# Patient Record
Sex: FEMALE | Race: WHITE | Employment: UNEMPLOYED | URBAN - METROPOLITAN AREA
[De-identification: names, ages, dates, MRNs, and addresses within clinical notes are randomized per-mention and may not be internally consistent; named-entity substitution may affect disease eponyms.]

---

## 2018-08-23 ENCOUNTER — HOSPITAL ENCOUNTER (OUTPATIENT)
Dept: WOUND CARE | Age: 48
Discharge: HOME | End: 2018-08-23
Attending: PHYSICIAN ASSISTANT | Admitting: PHYSICIAN ASSISTANT

## 2018-08-23 VITALS — RESPIRATION RATE: 16 BRPM | SYSTOLIC BLOOD PRESSURE: 173 MMHG | HEART RATE: 76 BPM | DIASTOLIC BLOOD PRESSURE: 101 MMHG

## 2018-08-23 RX ORDER — ATENOLOL 50 MG/1
50 TABLET ORAL DAILY
COMMUNITY

## 2018-08-23 RX ORDER — TRAMADOL HYDROCHLORIDE 50 MG/1
50 TABLET ORAL EVERY 4 HOURS PRN
COMMUNITY

## 2018-08-23 RX ORDER — LIDOCAINE HYDROCHLORIDE 40 MG/ML
SOLUTION TOPICAL ONCE
Status: DISCONTINUED | OUTPATIENT
Start: 2018-08-23 | End: 2018-08-24 | Stop reason: HOSPADM

## 2018-08-23 ASSESSMENT — PAIN DESCRIPTION - PAIN TYPE: TYPE: CHRONIC PAIN

## 2018-08-30 ENCOUNTER — HOSPITAL ENCOUNTER (OUTPATIENT)
Dept: WOUND CARE | Age: 48
Discharge: HOME | End: 2018-08-30
Attending: PHYSICIAN ASSISTANT | Admitting: PHYSICIAN ASSISTANT

## 2018-08-30 VITALS
HEART RATE: 81 BPM | RESPIRATION RATE: 16 BRPM | TEMPERATURE: 98 F | SYSTOLIC BLOOD PRESSURE: 159 MMHG | DIASTOLIC BLOOD PRESSURE: 91 MMHG

## 2018-08-30 RX ORDER — LOSARTAN POTASSIUM 25 MG/1
25 TABLET ORAL DAILY
COMMUNITY

## 2018-08-30 RX ORDER — LIDOCAINE HYDROCHLORIDE 40 MG/ML
SOLUTION TOPICAL ONCE
Status: DISCONTINUED | OUTPATIENT
Start: 2018-08-30 | End: 2018-08-31 | Stop reason: HOSPADM

## 2018-08-30 ASSESSMENT — PAIN DESCRIPTION - PAIN TYPE: TYPE: CHRONIC PAIN

## 2018-08-30 ASSESSMENT — PAIN SCALES - GENERAL: PAINLEVEL_OUTOF10: 9

## 2018-08-30 ASSESSMENT — PAIN DESCRIPTION - LOCATION: LOCATION: GENERALIZED

## 2018-08-31 NOTE — PROGRESS NOTES
Image   8/23/2018  1:45 PM   Wound Type Wound 8/30/2018  1:39 PM   Wound Pressure Stage  3 8/30/2018  1:39 PM   Wound Cleansed Rinsed/Irrigated with saline 8/30/2018  1:39 PM   Wound Length (cm) 1.4 cm 8/30/2018  1:56 PM   Wound Width (cm) 1.2 cm 8/30/2018  1:56 PM   Wound Depth (cm)  0.1 8/30/2018  1:56 PM   Calculated Wound Size (cm^2) (l*w) 1.68 cm^2 8/30/2018  1:56 PM   Change in Wound Size % (l*w) 6.67 8/30/2018  1:56 PM   Wound Assessment Bleeding 8/30/2018  1:56 PM   Drainage Amount Moderate 8/30/2018  1:56 PM   Drainage Description Serosanguinous 8/30/2018  1:56 PM   Odor None 8/30/2018  1:39 PM   Sarah-wound Assessment Dry 8/30/2018  1:39 PM   [place]%Wound Bed 80 8/30/2018  1:39 PM   Red%Wound Bed 0 8/30/2018  1:39 PM   Yellow%Wound Bed 20 8/30/2018  1:39 PM   Black%Wound Bed 0 8/30/2018  1:39 PM   Purple%Wound Bed 0 8/30/2018  1:39 PM   Other%Wound Bed 0 8/30/2018  1:39 PM   Time out Yes 8/30/2018  1:39 PM   Op First Treatment Date 08/23/18 8/23/2018  1:45 PM   Number of days: 7           Total Surface Area Debrided:  1.68 sq cm     Percentage of wound debrided 100%    Bleeding:  Minimal    Hemostasis Achieved:  by pressure    Procedural Pain:  0  / 10     Post Procedural Pain:  0 / 10     Response to treatment:  Well tolerated by patient. The nature of the patient's condition was explained in depth.  The patient was informed that their compliance to the treatment plan is paramount to successful healing and prevention of further ulceration and/or infection     Written Patient Discharge Instructions Given            Electronically signed by Porter Muñoz PA-C on 8/30/2018 at 8:14 PM

## 2018-09-06 ENCOUNTER — HOSPITAL ENCOUNTER (OUTPATIENT)
Dept: WOUND CARE | Age: 48
Discharge: HOME | End: 2018-09-06
Attending: PHYSICIAN ASSISTANT | Admitting: PHYSICIAN ASSISTANT

## 2018-09-06 VITALS — SYSTOLIC BLOOD PRESSURE: 176 MMHG | HEART RATE: 80 BPM | DIASTOLIC BLOOD PRESSURE: 104 MMHG

## 2018-09-06 DIAGNOSIS — L97.321 LOWER LIMB ULCER, ANKLE, LEFT, LIMITED TO BREAKDOWN OF SKIN (HCC): Primary | ICD-10-CM

## 2018-09-06 RX ORDER — LIDOCAINE HYDROCHLORIDE 40 MG/ML
SOLUTION TOPICAL ONCE
Status: DISCONTINUED | OUTPATIENT
Start: 2018-09-06 | End: 2018-09-07 | Stop reason: HOSPADM

## 2018-09-06 ASSESSMENT — PAIN DESCRIPTION - PAIN TYPE: TYPE: CHRONIC PAIN

## 2018-09-13 ENCOUNTER — HOSPITAL ENCOUNTER (OUTPATIENT)
Dept: WOUND CARE | Age: 48
Discharge: HOME | End: 2018-09-13
Attending: PHYSICIAN ASSISTANT | Admitting: PHYSICIAN ASSISTANT

## 2018-09-13 VITALS
TEMPERATURE: 97.4 F | RESPIRATION RATE: 16 BRPM | SYSTOLIC BLOOD PRESSURE: 157 MMHG | HEART RATE: 68 BPM | DIASTOLIC BLOOD PRESSURE: 92 MMHG

## 2018-09-13 RX ORDER — LIDOCAINE HYDROCHLORIDE 40 MG/ML
SOLUTION TOPICAL ONCE
Status: DISCONTINUED | OUTPATIENT
Start: 2018-09-13 | End: 2018-09-14 | Stop reason: HOSPADM

## 2018-09-20 ENCOUNTER — HOSPITAL ENCOUNTER (OUTPATIENT)
Dept: WOUND CARE | Age: 48
Discharge: HOME | End: 2018-09-20
Attending: PHYSICIAN ASSISTANT | Admitting: PHYSICIAN ASSISTANT

## 2018-09-20 ENCOUNTER — HOSPITAL ENCOUNTER (OUTPATIENT)
Dept: OTHER | Age: 48
Discharge: HOME | End: 2018-09-20
Attending: PHYSICIAN ASSISTANT | Admitting: PHYSICIAN ASSISTANT

## 2018-09-20 VITALS — DIASTOLIC BLOOD PRESSURE: 104 MMHG | RESPIRATION RATE: 16 BRPM | HEART RATE: 95 BPM | SYSTOLIC BLOOD PRESSURE: 159 MMHG

## 2018-09-20 DIAGNOSIS — L97.321 LOWER LIMB ULCER, ANKLE, LEFT, LIMITED TO BREAKDOWN OF SKIN (HCC): ICD-10-CM

## 2018-09-20 LAB
C-REACTIVE PROTEIN: 4.4 MG/L (ref 0–5.1)
SEDIMENTATION RATE, ERYTHROCYTE: 9 MM/HR (ref 0–20)

## 2018-09-20 RX ORDER — LIDOCAINE HYDROCHLORIDE 40 MG/ML
SOLUTION TOPICAL ONCE
Status: DISCONTINUED | OUTPATIENT
Start: 2018-09-20 | End: 2018-09-21 | Stop reason: HOSPADM

## 2018-09-20 NOTE — PROGRESS NOTES
[facility]  Progress Note and Procedure Note      Diana Senior  AGE: 53 y.o. GENDER: female  : 1970  TODAY'S DATE:  2018    Subjective:     Chief Complaint   Patient presents with    Wound Check     Left Ankle wound         HISTORY of PRESENT ILLNESS BRAYAN Fajardo a 53 y.o. female who presents today for wound evaluation. History of Wound: Patient is a pleasant 54 yo paraplegic female who presented with a left lateral ankle ulcer that started back in . She believes it is related to her hitting her ankle on her shower chair-she also already cushioned this area. She has been applying a bandaid on it. Just got workman comp to approve wound visit. She was paralyzed at L1 back in  after falling off a piece of construction equipment. She has chronic pain of back and thighs that are intermittent. She has no sensation in her lower legs.       Wound Pain:  none  Severity:  0 / 10   Wound Type:  traumatic  Modifying Factors:  chronic pressure and decreased mobility  Associated Signs/Symptoms:  drainage          PAST MEDICAL HISTORY        Diagnosis Date    Hypertension     Neuromuscular disorder ([facility] [address]     L1 paralysis, accident       PAST SURGICAL HISTORY    Past Surgical History:   Procedure Laterality Date    SPINAL FUSION         FAMILY HISTORY    History reviewed. No pertinent family history.     SOCIAL HISTORY    Social History   Substance Use Topics    Smoking status: Never Smoker    Smokeless tobacco: Never Used    Alcohol use Yes      Comment: socially       ALLERGIES    Allergies   Allergen Reactions    Sulfa Antibiotics Shortness Of Breath       MEDICATIONS    Current Outpatient Prescriptions on File Prior to Encounter   Medication Sig Dispense Refill    losartan (COZAAR) 25 MG tablet Take 25 mg by mouth daily      atenolol (TENORMIN) 50 MG tablet Take 50 mg by mouth daily      traMADol (ULTRAM) 50 MG tablet Take 50 mg by mouth every 4 hours as needed for Pain. .       No current facility-administered medications on file prior to encounter. REVIEW OF SYSTEMS    Pertinent items are noted in HPI. Objective:      BP (!) 159/104   Pulse 95   Resp 16     PHYSICAL EXAM       General Appearance: alert and oriented to person, place and time, well developed and well- nourished, in no acute distress  Skin: ulcer left lateral ankle. No cellulitis. Head: normocephalic and atraumatic  Neck: supple and non-tender without mass, no thyromegaly or thyroid nodules, no cervical lymphadenopathy  Pulmonary/Chest: clear to auscultation bilaterally- no wheezes, rales or rhonchi, normal air movement, no respiratory distress  Cardiovascular: normal rate, regular rhythm, normal S1 and S2, no murmurs, rubs, clicks, or gallops, distal pulses intact, no carotid bruits  Abdomen: soft, non-tender, non-distended, normal bowel sounds, no masses or organomegaly  Extremities: 1+ edema L leg  Musculoskeletal: atrophied and paralyzed BLE. Neurologic: reflexes normal and symmetric, no cranial nerve deficit,paralyzes form waist down      L ankle xray: no evidence of osteo  crp 4.4-normal  Sed 9-normal  Assessment and plan:     1. Pressure Ulcer left later ankle over malleolus-subcutaneous. Ulcer is superficial. No cellulitis. Wound debrided. Cutimed and tubi .     2. Paraplegia-encourage her to have padding/offloading of ankle. Procedure Note    Performed by: Kee Quintero PA-C    Consent obtained: Yes    Time out taken:  Yes    Pain Control: Anesthetic  Anesthetic: 4% Lidocaine Liquid Topical     Debridement:Excisional Debridement    Using curette the wound was sharply debrided    down through and including the removal of subcutaneous tissue.         Devitalized Tissue Debrided:  fibrin, slough and necrotic/eschar    Pre Debridement Measurements:  Are located in the Wound Documentation Flow Sheet    Wound #: 1     Post  Debridement Measurements:  Wound

## 2018-09-27 ENCOUNTER — HOSPITAL ENCOUNTER (OUTPATIENT)
Dept: WOUND CARE | Age: 48
Discharge: HOME | End: 2018-09-27
Payer: COMMERCIAL

## 2018-09-27 DIAGNOSIS — L89.893 PRESSURE ULCER OF LEFT FOOT, STAGE 3 (HCC): ICD-10-CM

## 2018-09-27 PROCEDURE — 11042 DBRDMT SUBQ TIS 1ST 20SQCM/<: CPT

## 2018-09-27 PROCEDURE — 11042 DBRDMT SUBQ TIS 1ST 20SQCM/<: CPT | Performed by: INTERNAL MEDICINE

## 2018-09-27 RX ORDER — LIDOCAINE HYDROCHLORIDE 40 MG/ML
SOLUTION TOPICAL ONCE
Status: DISCONTINUED | OUTPATIENT
Start: 2018-09-27 | End: 2018-09-28 | Stop reason: HOSPADM

## 2018-09-27 NOTE — PROGRESS NOTES
[facility]  Progress Note and Procedure Note      Reed Smith  AGE: 53 y.o. GENDER: female  : 1970  TODAY'S DATE:  2018    Subjective:     Chief Complaint   Patient presents with    Wound Check     Left Ankle         HISTORY of PRESENT ILLNESS HPI     Reed Smith is a 53 y.o. female who presents today for wound evaluation. History of Wound: 52 yo paraplegic female who presented with a left lateral ankle ulcer that started back in   Wound Pain:  none  Severity:  0 / 10   Wound Type:  traumatic  Modifying Factors:  chronic pressure and decreased mobility  Associated Signs/Symptoms:  drainage            PAST MEDICAL HISTORY        Diagnosis Date    Hypertension     Neuromuscular disorder ([facility] [address]     L1 paralysis, accident       PAST SURGICAL HISTORY    Past Surgical History:   Procedure Laterality Date    SPINAL FUSION         FAMILY HISTORY    History reviewed. No pertinent family history. SOCIAL HISTORY    Social History   Substance Use Topics    Smoking status: Never Smoker    Smokeless tobacco: Never Used    Alcohol use Yes      Comment: socially       ALLERGIES    Allergies   Allergen Reactions    Sulfa Antibiotics Shortness Of Breath       MEDICATIONS    Current Outpatient Prescriptions on File Prior to Encounter   Medication Sig Dispense Refill    losartan (COZAAR) 25 MG tablet Take 25 mg by mouth daily      atenolol (TENORMIN) 50 MG tablet Take 50 mg by mouth daily      traMADol (ULTRAM) 50 MG tablet Take 50 mg by mouth every 4 hours as needed for Pain. .       No current facility-administered medications on file prior to encounter. REVIEW OF SYSTEMS    Pertinent items are noted in HPI. Objective: There were no vitals taken for this visit.     PHYSICAL EXAM    General Appearance: alert and oriented to person, place and time, well-developed and well-nourished, in no acute distress  Skin: warm and dry  Head: normocephalic and atraumatic  Eyes: extraocular eye movements intact and conjunctivae normal  Extremities: no cyanosis and no clubbing  Musculoskeletal: normal range of motion, no joint swelling, deformity or tenderness      Assessment:     Patient Active Problem List   Diagnosis    Pressure ulcer of left foot, stage 3 (HCC)       Procedure Note    Performed by: Polina Nunez DO    Consent obtained: Yes    Time out taken:  Yes    Pain Control: Anesthetic  Anesthetic: 4% Lidocaine Liquid Topical     Debridement:Excisional Debridement    Using curette the wound was sharply debrided    down through and including the removal of subcutaneous tissue.         Devitalized Tissue Debrided:  slough and exudate    Pre Debridement Measurements:  Are located in the Wound Documentation Flow Sheet    Wound #: 1     Post  Debridement Measurements:  Wound 08/23/18 Ankle Left;Lateral #1 (Active)   Wound Image   9/27/2018 11:22 AM   Wound Type Wound 9/27/2018 11:22 AM   Wound Pressure Stage  2 9/27/2018 11:22 AM   Wound Cleansed Rinsed/Irrigated with saline 9/27/2018 11:33 AM   Wound Length (cm) 1.1 cm 9/27/2018 11:33 AM   Wound Width (cm) 1 cm 9/27/2018 11:33 AM   Wound Depth (cm)  0.1 9/27/2018 11:33 AM   Calculated Wound Size (cm^2) (l*w) 1.1 cm^2 9/27/2018 11:33 AM   Change in Wound Size % (l*w) 38.89 9/27/2018 11:33 AM   Wound Assessment Bleeding 9/27/2018 11:33 AM   Drainage Amount Moderate 9/27/2018 11:33 AM   Drainage Description Serosanguinous 9/27/2018 11:22 AM   Odor None 9/27/2018 11:22 AM   Sarah-wound Assessment Clean;Dry 9/27/2018 11:22 AM   [place]%Wound Bed 100 9/27/2018 11:22 AM   Red%Wound Bed 0 9/27/2018 11:22 AM   Yellow%Wound Bed 0 9/27/2018 11:22 AM   Black%Wound Bed 0 9/27/2018 11:22 AM   Purple%Wound Bed 0 9/27/2018 11:22 AM   Other%Wound Bed 0 9/20/2018  1:50 PM   Time out Yes 9/27/2018 11:33 AM   Op First Treatment Date 08/23/18 8/23/2018  1:45 PM   Number of days: 34           Total Surface Area Debrided:     1.1 sq cm

## 2018-10-04 ENCOUNTER — HOSPITAL ENCOUNTER (OUTPATIENT)
Dept: WOUND CARE | Age: 48
Discharge: HOME | End: 2018-10-04
Payer: COMMERCIAL

## 2018-10-04 VITALS
TEMPERATURE: 97.2 F | HEART RATE: 82 BPM | RESPIRATION RATE: 16 BRPM | DIASTOLIC BLOOD PRESSURE: 100 MMHG | SYSTOLIC BLOOD PRESSURE: 152 MMHG

## 2018-10-04 PROCEDURE — 11042 DBRDMT SUBQ TIS 1ST 20SQCM/<: CPT

## 2018-10-04 RX ORDER — LIDOCAINE HYDROCHLORIDE 40 MG/ML
SOLUTION TOPICAL ONCE
Status: DISCONTINUED | OUTPATIENT
Start: 2018-10-04 | End: 2018-10-05 | Stop reason: HOSPADM

## 2018-10-11 ENCOUNTER — HOSPITAL ENCOUNTER (OUTPATIENT)
Dept: WOUND CARE | Age: 48
Discharge: HOME | End: 2018-10-11
Payer: COMMERCIAL

## 2018-10-11 VITALS
DIASTOLIC BLOOD PRESSURE: 97 MMHG | TEMPERATURE: 97.6 F | HEART RATE: 78 BPM | SYSTOLIC BLOOD PRESSURE: 169 MMHG | RESPIRATION RATE: 18 BRPM

## 2018-10-11 PROCEDURE — 11042 DBRDMT SUBQ TIS 1ST 20SQCM/<: CPT

## 2018-10-11 RX ORDER — LIDOCAINE HYDROCHLORIDE 40 MG/ML
SOLUTION TOPICAL ONCE
Status: DISCONTINUED | OUTPATIENT
Start: 2018-10-11 | End: 2018-10-12 | Stop reason: HOSPADM

## 2018-10-11 ASSESSMENT — PAIN DESCRIPTION - ORIENTATION: ORIENTATION: RIGHT;LEFT

## 2018-10-11 ASSESSMENT — PAIN SCALES - GENERAL: PAINLEVEL_OUTOF10: 8

## 2018-10-11 ASSESSMENT — PAIN DESCRIPTION - LOCATION: LOCATION: BACK;LEG;GENERALIZED

## 2018-10-11 ASSESSMENT — PAIN DESCRIPTION - PAIN TYPE: TYPE: CHRONIC PAIN

## 2018-10-18 ENCOUNTER — HOSPITAL ENCOUNTER (OUTPATIENT)
Dept: WOUND CARE | Age: 48
Discharge: HOME | End: 2018-10-18
Payer: COMMERCIAL

## 2018-10-18 VITALS
SYSTOLIC BLOOD PRESSURE: 153 MMHG | TEMPERATURE: 97.8 F | HEART RATE: 85 BPM | RESPIRATION RATE: 16 BRPM | DIASTOLIC BLOOD PRESSURE: 92 MMHG

## 2018-10-18 PROCEDURE — 15271 SKIN SUB GRAFT TRNK/ARM/LEG: CPT

## 2018-10-18 RX ORDER — LIDOCAINE HYDROCHLORIDE 40 MG/ML
SOLUTION TOPICAL ONCE
Status: DISCONTINUED | OUTPATIENT
Start: 2018-10-18 | End: 2018-10-19 | Stop reason: HOSPADM

## 2018-10-18 ASSESSMENT — PAIN DESCRIPTION - ORIENTATION: ORIENTATION: RIGHT;LEFT

## 2018-10-18 ASSESSMENT — PAIN DESCRIPTION - PAIN TYPE: TYPE: CHRONIC PAIN

## 2018-10-18 ASSESSMENT — PAIN DESCRIPTION - LOCATION: LOCATION: BACK;GENERALIZED;LEG

## 2018-10-18 ASSESSMENT — PAIN SCALES - GENERAL: PAINLEVEL_OUTOF10: 5

## 2018-10-19 NOTE — PROGRESS NOTES
prior to encounter. REVIEW OF SYSTEMS    Pertinent items are noted in HPI. Objective:      BP (!) 153/92   Pulse 85   Temp 97.8 °F (36.6 °C) (Oral)   Resp 16     PHYSICAL EXAM    General Appearance: alert and oriented to person, place and time, well developed and well- nourished, in no acute distress  Skin: ulcer left lateral ankle as mentioned below. No cellulitis. Head: normocephalic and atraumatic  Neck: supple and non-tender without mass, no thyromegaly or thyroid nodules, no cervical lymphadenopathy  Pulmonary/Chest: clear to auscultation bilaterally- no wheezes, rales or rhonchi, normal air movement, no respiratory distress  Cardiovascular: normal rate, regular rhythm, normal S1 and S2, no murmurs, rubs, clicks, or gallops, distal pulses intact, no carotid bruits  Abdomen: soft, non-tender, non-distended, normal bowel sounds, no masses or organomegaly  Extremities: 1+ edema L leg  Musculoskeletal: atrophied and paralyzed BLE. Neurologic:  no cranial nerve deficit,paralyzes form waist down    Assessment:    1. Pressure Ulcer left later ankle over malleolus. Ulcer is superficial. No cellulitis. Wound debrided. Xray, sed rate ok. Puraply applied today. Follow up one week.    2. Paraplegia-encourage her to have padding/offloading of ankle.      Patient Active Problem List   Diagnosis    Pressure ulcer of left foot, stage 3 ([address]       Procedure Note    Performed by: Sam Schaffer PA-C    Consent obtained: Yes    Time out taken:  Yes    Pain Control: Anesthetic  Anesthetic: 4% Lidocaine Liquid Topical     Debridement:Excisional Debridement    Using curette the wound was sharply debrided    down through and including the removal of dermis and subcutaneous tissue.         Devitalized Tissue Debrided:  slough and subcutaneous tissue    Pre Debridement Measurements:  Are located in the Wound Documentation Flow Sheet    Wound #: 1     Post  Debridement Measurements:  Wound 08/23/18 Ankle Left;Lateral

## 2018-10-25 ENCOUNTER — HOSPITAL ENCOUNTER (OUTPATIENT)
Dept: WOUND CARE | Age: 48
Discharge: HOME | End: 2018-10-25
Payer: COMMERCIAL

## 2018-10-25 VITALS — HEART RATE: 72 BPM | RESPIRATION RATE: 16 BRPM | SYSTOLIC BLOOD PRESSURE: 153 MMHG | DIASTOLIC BLOOD PRESSURE: 97 MMHG

## 2018-10-25 PROCEDURE — 15271 SKIN SUB GRAFT TRNK/ARM/LEG: CPT

## 2018-11-01 ENCOUNTER — HOSPITAL ENCOUNTER (OUTPATIENT)
Dept: WOUND CARE | Age: 48
Discharge: HOME | End: 2018-11-01
Payer: COMMERCIAL

## 2018-11-01 VITALS — RESPIRATION RATE: 16 BRPM | SYSTOLIC BLOOD PRESSURE: 156 MMHG | DIASTOLIC BLOOD PRESSURE: 101 MMHG | HEART RATE: 95 BPM

## 2018-11-01 PROCEDURE — 15271 SKIN SUB GRAFT TRNK/ARM/LEG: CPT

## 2018-11-01 RX ORDER — LIDOCAINE HYDROCHLORIDE 40 MG/ML
SOLUTION TOPICAL ONCE
Status: DISCONTINUED | OUTPATIENT
Start: 2018-11-01 | End: 2018-11-02 | Stop reason: HOSPADM

## 2018-11-07 ENCOUNTER — HOSPITAL ENCOUNTER (OUTPATIENT)
Dept: WOUND CARE | Age: 48
Discharge: HOME | End: 2018-11-07
Payer: COMMERCIAL

## 2018-11-07 VITALS — HEART RATE: 74 BPM | RESPIRATION RATE: 16 BRPM | SYSTOLIC BLOOD PRESSURE: 165 MMHG | DIASTOLIC BLOOD PRESSURE: 101 MMHG

## 2018-11-07 PROCEDURE — 15275 SKIN SUB GRAFT FACE/NK/HF/G: CPT

## 2018-11-07 ASSESSMENT — PAIN SCALES - GENERAL: PAINLEVEL_OUTOF10: 7

## 2018-11-07 ASSESSMENT — PAIN DESCRIPTION - PAIN TYPE: TYPE: CHRONIC PAIN

## 2018-11-07 ASSESSMENT — PAIN DESCRIPTION - LOCATION: LOCATION: GENERALIZED

## 2018-11-07 NOTE — PROGRESS NOTES
Rinsed/Irrigated with saline 2018 12:41 PM   Wound Length (cm) 1.5 cm 2018 12:41 PM   Wound Width (cm) 1.2 cm 2018 12:41 PM   Wound Depth (cm)  0.3 2018 12:41 PM   Calculated Wound Size (cm^2) (l*w) 1.8 cm^2 2018 12:41 PM   Change in Wound Size % (l*w) 0 2018 12:41 PM   Wound Assessment Pink;Yellow 2018 12:41 PM   Drainage Amount Moderate 2018 12:41 PM   Drainage Description Yellow;Green 2018 12:41 PM   Odor None 2018 12:41 PM   Sarah-wound Assessment Dark edges; White 2018 12:41 PM   [place]%Wound Bed 50 2018 12:41 PM   Red%Wound Bed 0 2018 12:41 PM   Yellow%Wound Bed 50 2018 12:41 PM   Black%Wound Bed 0 2018 12:41 PM   Purple%Wound Bed 0 2018 12:41 PM   Other%Wound Bed 0 2018 12:41 PM   Time out Yes 2018  1:21 PM   Op First Treatment Date 18  1:45 PM   Number of days: 76           Total Surface Area Debrided:  1.8 sq cm     Percentage of wound debrided 100%    Bleeding:  Minimal    Hemostasis Achieved:  by pressure    Procedural Pain:  0  / 10     Post Procedural Pain:  0 / 10     Response to treatment:  Well tolerated by patient. Renu Lyon  AGE: 53 y.o. GENDER: female  : 1970  TODAY'S DATE:  2018    Chief Complaint   Patient presents with    Wound Check     Left lateral ankle          Skin Substitute Applied:       Puraply 1.6 sq/cm            Performed by: Cristy Mills DPM    Wound Type:pressure    Consent obtained: Yes    Time out taken: Yes     Fenestrated: No    Instrument(s) curette      [] Mesher Utilized    All  Guidelines Followed    Skin Substitute was Applied to Wound Number(s):     Wound #: 1      Wound 18 Ankle Left;Lateral #1 (Active)   Wound Image   2018 11:22 AM   Wound Type Wound 2018 12:41 PM   Wound Pressure Stage  3 2018 12:41 PM   Dressing/Treatment Al[place];Dry dressing;Silver dressing 10/4/2018  1:28 PM   Wound Cleansed Rinsed/Irrigated with saline 11/7/2018 12:41 PM   Wound Length (cm) 1.5 cm 11/7/2018 12:41 PM   Wound Width (cm) 1.2 cm 11/7/2018 12:41 PM   Wound Depth (cm)  0.3 11/7/2018 12:41 PM   Calculated Wound Size (cm^2) (l*w) 1.8 cm^2 11/7/2018 12:41 PM   Change in Wound Size % (l*w) 0 11/7/2018 12:41 PM   Wound Assessment Pink;Yellow 11/7/2018 12:41 PM   Drainage Amount Moderate 11/7/2018 12:41 PM   Drainage Description Yellow;Green 11/7/2018 12:41 PM   Odor None 11/7/2018 12:41 PM   Sarah-wound Assessment Dark edges; White 11/7/2018 12:41 PM   [place]%Wound Bed 50 11/7/2018 12:41 PM   Red%Wound Bed 0 11/7/2018 12:41 PM   Yellow%Wound Bed 50 11/7/2018 12:41 PM   Black%Wound Bed 0 11/7/2018 12:41 PM   Purple%Wound Bed 0 11/7/2018 12:41 PM   Other%Wound Bed 0 11/7/2018 12:41 PM   Time out Yes 11/1/2018  1:21 PM   Op First Treatment Date 08/23/18 8/23/2018  1:45 PM   Number of days: 76         Total Surface Area Covered 1.8 sq/cm     Amount Wasted 0 sq/cm    Reason for Waste none      Was the Product Layered  No     Secured: Yes    Secured With:  [x]Steri Strips    []Sutures     []Staples []Other    Procedural Pain: 0/10     Post Procedural Pain: 0 / 10    Response to Treatment:  Well tolerated by patient. Plan:   Patient examined and evaluated  Wound debrided without incident  Wound offloaded  Recommend Posey deluxe podis boot to prevent external rotation on the patient's sleeping. Follow-up in 1 week  The nature of the patient's condition was explained in depth.  The patient was informed that their compliance to the treatment plan is paramount to successful healing and prevention of further ulceration and/or infection     Discharge Treatment  leave dressing intact for 1 week keep leg elevated when not active    Written Patient Discharge Instructions Given            Electronically signed by Izzy Polo DPM on 11/7/2018 at 3:13 PM

## 2018-11-07 NOTE — PLAN OF CARE
Problem: Wound:  Goal: Will show signs of wound healing; wound closure and no evidence of infection  Will show signs of wound healing; wound closure and no evidence of infection   Outcome: Ongoing  Discharge instructions given. Patient verbalized understanding. Return to [facility] in 1 week. Called/faxed orders to  Workers comp for off loading boot    [] antibiotics    [] X-ray     [] Culture   [x] Debridement      [] HBO Evaluation    [] LABS   [] Vascular Studies [] *  PuraPly AMTreatment Note        Goal:  Patient will receive safe and proper application of skin substitute. Patient will comply with caring for dressing, and reporting complications. []Expiration date checked immediately prior to use. [] Package intact prior to use and no damage noted. PuraPly was removed from protective sterile packaging by provider and applied to prepared ulcer bed. PuraPly was hydrated with sterile normal saline per provider. PuraPly was applied to Left Lateral ankle and affixed with steri-strips by the provider. [x] PuraPly was covered with non-adherent ulcer dressing. [x] Applied Dry dressing  (secondary dressing)    [] Coban or ace wrap was applied to secure graft and decrease edema. Patient/caregiver was instructed not to remove dressing and to keep it clean and dry. PuraPly may be applied a total of 10 times per wound over a 12 week period. Additionally PuraPly may only be used every 12 months per wound. Date of first application of PuraPly for this current wound is October 18, 2018 .         Application # 4      Guidelines followed    Electronically signed by Liliana May RN on 11/7/2018 at 1:10 PM

## 2018-11-15 ENCOUNTER — HOSPITAL ENCOUNTER (OUTPATIENT)
Dept: WOUND CARE | Age: 48
Discharge: HOME | End: 2018-11-15
Payer: COMMERCIAL

## 2018-11-15 VITALS — RESPIRATION RATE: 16 BRPM | DIASTOLIC BLOOD PRESSURE: 109 MMHG | SYSTOLIC BLOOD PRESSURE: 176 MMHG | HEART RATE: 90 BPM

## 2018-11-15 PROCEDURE — 15271 SKIN SUB GRAFT TRNK/ARM/LEG: CPT

## 2018-11-15 RX ORDER — LIDOCAINE HYDROCHLORIDE 40 MG/ML
SOLUTION TOPICAL ONCE
Status: DISCONTINUED | OUTPATIENT
Start: 2018-11-15 | End: 2018-11-16 | Stop reason: HOSPADM

## 2018-11-16 NOTE — PROGRESS NOTES
No    Instrument(s) forceps      [] Mesher Utilized    All  Guidelines Followed    Skin Substitute was Applied to Wound Number(s): Wound #: 1      Wound 08/23/18 Ankle Left;Lateral #1 (Active)   Wound Image   9/27/2018 11:22 AM   Wound Type Wound 11/15/2018  1:40 PM   Wound Pressure Stage  3 11/15/2018  1:40 PM   Dressing/Treatment Dry dressing 11/15/2018  2:08 PM   Wound Cleansed Rinsed/Irrigated with saline 11/15/2018  1:40 PM   Wound Length (cm) 1 cm 11/15/2018  2:08 PM   Wound Width (cm) 0.6 cm 11/15/2018  2:08 PM   Wound Depth (cm)  0.3 11/15/2018  2:08 PM   Calculated Wound Size (cm^2) (l*w) 0.6 cm^2 11/15/2018  2:08 PM   Change in Wound Size % (l*w) 66.67 11/15/2018  2:08 PM   Wound Assessment Bleeding 11/15/2018  2:08 PM   Drainage Amount Moderate 11/15/2018  2:08 PM   Drainage Description Serosanguinous 11/15/2018  2:08 PM   Odor None 11/15/2018  1:40 PM   Sarah-wound Assessment [place] 11/15/2018  1:40 PM   [place]%Wound Bed 100 11/15/2018  1:40 PM   Red%Wound Bed 0 11/15/2018  1:40 PM   Yellow%Wound Bed 0 11/15/2018  1:40 PM   Black%Wound Bed 0 11/15/2018  1:40 PM   Purple%Wound Bed 0 11/15/2018  1:40 PM   Other%Wound Bed 0 11/7/2018 12:41 PM   Time out Yes 11/15/2018  1:40 PM   Op First Treatment Date 08/23/18 8/23/2018  1:45 PM   Number of days: 84         Total Surface Area Covered . 6 sq/cm     Amount Wasted 0 sq/cm    Reason for Waste na     Was the Product Layered  No     Secured: Yes    Secured With:  [x]Steri Strips    []Sutures     []Staples []Other    Procedural Pain: 0/10     Post Procedural Pain: 0 / 10    Response to Treatment:  Well tolerated by patient. Plan:     The nature of the patient's condition was explained in depth.  The patient was informed that their compliance to the treatment plan is paramount to successful healing and prevention of further ulceration and/or infection     Discharge Treatment Wound 08/23/18 Ankle Left;Lateral #1-Dressing/Treatment: Dry dressing

## 2018-11-21 ENCOUNTER — HOSPITAL ENCOUNTER (OUTPATIENT)
Dept: WOUND CARE | Age: 48
Discharge: HOME | End: 2018-11-21
Payer: COMMERCIAL

## 2018-11-21 VITALS
SYSTOLIC BLOOD PRESSURE: 152 MMHG | DIASTOLIC BLOOD PRESSURE: 97 MMHG | WEIGHT: 148 LBS | TEMPERATURE: 97 F | HEART RATE: 80 BPM

## 2018-11-21 PROCEDURE — 15275 SKIN SUB GRAFT FACE/NK/HF/G: CPT

## 2018-11-29 ENCOUNTER — HOSPITAL ENCOUNTER (OUTPATIENT)
Dept: WOUND CARE | Age: 48
Discharge: HOME | End: 2018-11-29
Payer: COMMERCIAL

## 2018-11-29 VITALS
RESPIRATION RATE: 16 BRPM | TEMPERATURE: 97.1 F | DIASTOLIC BLOOD PRESSURE: 99 MMHG | HEART RATE: 67 BPM | SYSTOLIC BLOOD PRESSURE: 158 MMHG

## 2018-11-29 PROCEDURE — 15275 SKIN SUB GRAFT FACE/NK/HF/G: CPT

## 2018-11-29 PROCEDURE — 15271 SKIN SUB GRAFT TRNK/ARM/LEG: CPT

## 2018-11-29 RX ORDER — LIDOCAINE HYDROCHLORIDE 40 MG/ML
SOLUTION TOPICAL ONCE
Status: DISCONTINUED | OUTPATIENT
Start: 2018-11-29 | End: 2018-11-30 | Stop reason: HOSPADM

## 2018-11-29 NOTE — PROGRESS NOTES
[facility]  Progress Note and Procedure Note      Americaradha Camilo  AGE: 53 y.o. GENDER: female  : 1970  TODAY'S DATE:  2018    Subjective:     Chief Complaint   Patient presents with    Wound Check     Left ankle         HISTORY of PRESENT ILLNESS HPI    Erika Chambers a 53 y.o. female who presents today for wound evaluation. History of Wound: Patient is a pleasant 54 yo paraplegic female who presented with a left lateral ankle ulcer that started back in . She believes it is related to her hitting her ankle on her shower chair-she also already cushioned this area. She has been applying a bandaid on it. Just got workman comp to approve wound visit. She was paralyzed at L1 back in  after falling off a piece of construction equipment. She has chronic pain of back and thighs that are intermittent. She has no sensation in her lower legs.  Has been getting puraply. She was seen by podiatry who recommended a Posey delux boot.    Wound Pain:  none  Severity:  0 / 10   Wound Type:  traumatic  Modifying Factors:  chronic pressure and decreased mobility  Associated Signs/Symptoms:  drainage     PAST MEDICAL HISTORY        Diagnosis Date    Hypertension     Neuromuscular disorder ([facility] [address]     L1 paralysis, accident       PAST SURGICAL HISTORY    Past Surgical History:   Procedure Laterality Date    SPINAL FUSION         FAMILY HISTORY    History reviewed. No pertinent family history.     SOCIAL HISTORY    Social History   Substance Use Topics    Smoking status: Never Smoker    Smokeless tobacco: Never Used    Alcohol use Yes      Comment: socially       ALLERGIES    Allergies   Allergen Reactions    Sulfa Antibiotics Shortness Of Breath       MEDICATIONS    Current Outpatient Prescriptions on File Prior to Encounter   Medication Sig Dispense Refill    losartan (COZAAR) 25 MG tablet Take 25 mg by mouth daily      atenolol (TENORMIN) 50 MG tablet Take 50 mg by

## 2018-12-05 ENCOUNTER — HOSPITAL ENCOUNTER (OUTPATIENT)
Dept: WOUND CARE | Age: 48
Discharge: HOME | End: 2018-12-05
Payer: COMMERCIAL

## 2018-12-05 VITALS
DIASTOLIC BLOOD PRESSURE: 99 MMHG | SYSTOLIC BLOOD PRESSURE: 163 MMHG | RESPIRATION RATE: 16 BRPM | TEMPERATURE: 97.2 F | HEART RATE: 69 BPM

## 2018-12-05 PROCEDURE — 15275 SKIN SUB GRAFT FACE/NK/HF/G: CPT

## 2018-12-05 ASSESSMENT — PAIN SCALES - GENERAL: PAINLEVEL_OUTOF10: 5

## 2018-12-05 NOTE — PLAN OF CARE
Problem: Wound:  Goal: Will show signs of wound healing; wound closure and no evidence of infection  Will show signs of wound healing; wound closure and no evidence of infection   Outcome: Ongoing  Discharge instructions given. Patient verbalized understanding. Return to [address] [place] [address],[facility] Floor in 1 week. [] antibiotics    [] X-ray     [] Culture   [x] Debridement      [] HBO Evaluation    [] LABS   [] Vascular Studies []     PuraPly AMTreatment Note        Goal:  Patient will receive safe and proper application of skin substitute. Patient will comply with caring for dressing, and reporting complications. [x]Expiration date checked immediately prior to use. [x] Package intact prior to use and no damage noted. PuraPly was removed from protective sterile packaging by provider and applied to prepared ulcer bed. PuraPly was hydrated with sterile normal saline per provider. PuraPly was applied to Left Ankle and affixed with steri-strips by the provider. [x] PuraPly was covered with non-adherent ulcer dressing. [x] Applied Gauze (secondary dressing)    [] Coban or ace wrap was applied to secure graft and decrease edema. Patient/caregiver was instructed not to remove dressing and to keep it clean and dry. PuraPly may be applied a total of 10 times per wound over a 12 week period. Additionally PuraPly may only be used every 12 months per wound. Date of first application of PuraPly for this current wound is October 18, 2018 .         Application # 7      Guidelines followed    Electronically signed by Hilda Cronin RN on 12/5/2018 at 4:09 PM

## 2018-12-12 ENCOUNTER — HOSPITAL ENCOUNTER (OUTPATIENT)
Dept: WOUND CARE | Age: 48
Discharge: HOME | End: 2018-12-12
Payer: COMMERCIAL

## 2018-12-12 VITALS
DIASTOLIC BLOOD PRESSURE: 111 MMHG | TEMPERATURE: 97.2 F | RESPIRATION RATE: 16 BRPM | HEART RATE: 78 BPM | SYSTOLIC BLOOD PRESSURE: 173 MMHG

## 2018-12-12 PROCEDURE — 11043 DBRDMT MUSC&/FSCA 1ST 20/<: CPT

## 2018-12-12 PROCEDURE — 11042 DBRDMT SUBQ TIS 1ST 20SQCM/<: CPT

## 2018-12-12 RX ORDER — LIDOCAINE HYDROCHLORIDE 40 MG/ML
SOLUTION TOPICAL ONCE
Status: DISCONTINUED | OUTPATIENT
Start: 2018-12-12 | End: 2018-12-13 | Stop reason: HOSPADM

## 2018-12-12 ASSESSMENT — PAIN DESCRIPTION - PAIN TYPE: TYPE: ACUTE PAIN

## 2018-12-12 ASSESSMENT — PAIN DESCRIPTION - ORIENTATION: ORIENTATION: LEFT

## 2018-12-12 ASSESSMENT — PAIN DESCRIPTION - LOCATION: LOCATION: GENERALIZED

## 2018-12-12 ASSESSMENT — PAIN SCALES - GENERAL: PAINLEVEL_OUTOF10: 10

## 2018-12-12 NOTE — PROGRESS NOTES
(cm) 0.7 cm 12/12/2018  2:47 PM   Wound Depth (cm) 0.3 cm 12/12/2018  2:47 PM   Wound Surface Area (cm^2) 0.91 cm^2 12/12/2018  2:47 PM   Change in Wound Size % (l*w) -102.22 12/12/2018  2:47 PM   Wound Volume (cm^3) 0.27 cm^3 12/12/2018  2:47 PM   Wound Healing % -200 12/12/2018  2:47 PM   Post-Procedure Length (cm) 1.3 cm 12/12/2018  2:56 PM   Post-Procedure Width (cm) 0.7 cm 12/12/2018  2:56 PM   Post-Procedure Depth (cm) 0.7 cm 12/12/2018  2:56 PM   Post-Procedure Surface Area (cm^2) 0.91 cm^2 12/12/2018  2:56 PM   Post-Procedure Volume (cm^3) 0.64 cm^3 12/12/2018  2:56 PM   Wound Assessment Bleeding 12/12/2018  2:56 PM   Drainage Amount Moderate 12/12/2018  2:47 PM   Drainage Description Serosanguinous 12/12/2018  2:47 PM   Odor None 12/12/2018  2:47 PM   Sarah-wound Assessment Dry 12/12/2018  2:47 PM   [place]%Wound Bed 25 12/12/2018  2:47 PM   Red%Wound Bed 0 12/12/2018  2:47 PM   Yellow%Wound Bed 75 12/12/2018  2:47 PM   Black%Wound Bed 0 12/12/2018  2:47 PM   Purple%Wound Bed 0 12/12/2018  2:47 PM   Other%Wound Bed 0 12/12/2018  2:47 PM   Number of days: 14         Total Surface Area Debrided:  0.91 sq cm     Percentage of wound debrided 100%    Bleeding:  Minimal    Hemostasis Achieved:  by pressure    Procedural Pain:  0  / 10     Post Procedural Pain:  0 / 10     Response to treatment:  Well tolerated by patient. Plan:   Patient examined and evaluated  Wound debrided without incident  Wound offloaded  [facility] approved Posey deluxe podis boot. Patient has ordered the boot however it has not arrived yet. Use nightly to prevent external rotation on the patient's sleeping. Follow-up in 1 week  The nature of the patient's condition was explained in depth.  The patient was informed that their compliance to the treatment plan is paramount to successful healing and prevention of further ulceration and/or infection     Discharge Treatment  leave dressing intact for 1 week keep leg elevated when not

## 2018-12-12 NOTE — PLAN OF CARE
Problem: Wound:  Goal: Will show signs of wound healing; wound closure and no evidence of infection  Will show signs of wound healing; wound closure and no evidence of infection   Outcome: Ongoing  Discharge instructions given. Patient verbalized understanding. Return to [facility] in 1 week.       [] antibiotics    [] X-ray     [] Culture   [x] Debridement      [] HBO Evaluation    [] LABS   [] Vascular Studies []

## 2018-12-21 ENCOUNTER — HOSPITAL ENCOUNTER (OUTPATIENT)
Dept: WOUND CARE | Age: 48
Discharge: HOME | End: 2018-12-21
Payer: COMMERCIAL

## 2018-12-21 VITALS
HEART RATE: 76 BPM | SYSTOLIC BLOOD PRESSURE: 155 MMHG | RESPIRATION RATE: 16 BRPM | TEMPERATURE: 97 F | DIASTOLIC BLOOD PRESSURE: 112 MMHG

## 2018-12-21 PROCEDURE — 15275 SKIN SUB GRAFT FACE/NK/HF/G: CPT

## 2018-12-21 RX ORDER — LIDOCAINE HYDROCHLORIDE 40 MG/ML
SOLUTION TOPICAL ONCE
Status: DISCONTINUED | OUTPATIENT
Start: 2018-12-21 | End: 2018-12-22 | Stop reason: HOSPADM

## 2018-12-21 ASSESSMENT — PAIN DESCRIPTION - LOCATION: LOCATION: GENERALIZED

## 2018-12-21 ASSESSMENT — PAIN DESCRIPTION - PAIN TYPE: TYPE: CHRONIC PAIN

## 2018-12-21 NOTE — PLAN OF CARE
Problem: Wound:  Goal: Will show signs of wound healing; wound closure and no evidence of infection  Will show signs of wound healing; wound closure and no evidence of infection   Outcome: Ongoing  Discharge instructions given. Patient verbalized understanding. Return to [address] [place] [address],[address] in 1 week.       [] antibiotics    [] X-ray     [] Culture   [x] Debridement      [] HBO Evaluation    [] LABS   [] Vascular Studies []

## 2018-12-26 ENCOUNTER — HOSPITAL ENCOUNTER (OUTPATIENT)
Dept: WOUND CARE | Age: 48
Discharge: HOME | End: 2018-12-26
Payer: COMMERCIAL

## 2018-12-26 VITALS
DIASTOLIC BLOOD PRESSURE: 98 MMHG | RESPIRATION RATE: 16 BRPM | SYSTOLIC BLOOD PRESSURE: 159 MMHG | TEMPERATURE: 97.4 F | HEART RATE: 83 BPM

## 2018-12-26 PROCEDURE — 11043 DBRDMT MUSC&/FSCA 1ST 20/<: CPT

## 2018-12-26 RX ORDER — LIDOCAINE HYDROCHLORIDE 40 MG/ML
SOLUTION TOPICAL ONCE
Status: DISCONTINUED | OUTPATIENT
Start: 2018-12-26 | End: 2018-12-27 | Stop reason: HOSPADM

## 2018-12-26 NOTE — PLAN OF CARE
Problem: Wound:  Goal: Will show signs of wound healing; wound closure and no evidence of infection  Will show signs of wound healing; wound closure and no evidence of infection   Outcome: Ongoing  Discharge instructions given. Patient verbalized understanding. Return to [address] [place] [address],[address] in 1 week.       [] antibiotics    [] X-ray     [] Culture   [x] Debridement      [] HBO Evaluation    [] LABS   [] Vascular Studies []

## 2019-01-04 ENCOUNTER — HOSPITAL ENCOUNTER (OUTPATIENT)
Dept: WOUND CARE | Age: 49
Discharge: HOME | End: 2019-01-04
Payer: COMMERCIAL

## 2019-01-04 PROCEDURE — 15275 SKIN SUB GRAFT FACE/NK/HF/G: CPT

## 2019-01-04 RX ORDER — LIDOCAINE HYDROCHLORIDE 40 MG/ML
SOLUTION TOPICAL ONCE
Status: DISCONTINUED | OUTPATIENT
Start: 2019-01-04 | End: 2019-01-05 | Stop reason: HOSPADM

## 2019-01-11 ENCOUNTER — HOSPITAL ENCOUNTER (OUTPATIENT)
Dept: WOUND CARE | Age: 49
Discharge: HOME | End: 2019-01-11
Payer: COMMERCIAL

## 2019-01-11 VITALS
HEART RATE: 75 BPM | SYSTOLIC BLOOD PRESSURE: 162 MMHG | DIASTOLIC BLOOD PRESSURE: 104 MMHG | RESPIRATION RATE: 16 BRPM | TEMPERATURE: 98 F

## 2019-01-11 PROCEDURE — 11043 DBRDMT MUSC&/FSCA 1ST 20/<: CPT

## 2019-01-11 PROCEDURE — 11042 DBRDMT SUBQ TIS 1ST 20SQCM/<: CPT

## 2019-01-11 RX ORDER — LIDOCAINE HYDROCHLORIDE 40 MG/ML
SOLUTION TOPICAL ONCE
Status: DISCONTINUED | OUTPATIENT
Start: 2019-01-11 | End: 2019-01-12 | Stop reason: HOSPADM

## 2019-01-11 ASSESSMENT — PAIN SCALES - GENERAL: PAINLEVEL_OUTOF10: 0

## 2019-01-16 ENCOUNTER — HOSPITAL ENCOUNTER (OUTPATIENT)
Dept: WOUND CARE | Age: 49
Discharge: HOME | End: 2019-01-16
Payer: COMMERCIAL

## 2019-01-16 VITALS — SYSTOLIC BLOOD PRESSURE: 178 MMHG | HEART RATE: 83 BPM | DIASTOLIC BLOOD PRESSURE: 108 MMHG | TEMPERATURE: 97.5 F

## 2019-01-16 PROCEDURE — 99213 OFFICE O/P EST LOW 20 MIN: CPT

## 2019-01-30 ENCOUNTER — HOSPITAL ENCOUNTER (OUTPATIENT)
Dept: WOUND CARE | Age: 49
Discharge: HOME | End: 2019-01-30
Payer: COMMERCIAL

## 2019-01-30 VITALS
HEART RATE: 86 BPM | DIASTOLIC BLOOD PRESSURE: 120 MMHG | TEMPERATURE: 97.7 F | SYSTOLIC BLOOD PRESSURE: 180 MMHG | RESPIRATION RATE: 16 BRPM

## 2019-01-30 PROCEDURE — 11043 DBRDMT MUSC&/FSCA 1ST 20/<: CPT

## 2019-01-30 RX ORDER — LIDOCAINE 40 MG/G
CREAM TOPICAL PRN
Status: DISCONTINUED | OUTPATIENT
Start: 2019-01-30 | End: 2019-01-31 | Stop reason: HOSPADM

## 2019-01-30 ASSESSMENT — PAIN DESCRIPTION - LOCATION: LOCATION: BACK;LEG

## 2019-01-30 ASSESSMENT — PAIN DESCRIPTION - PAIN TYPE: TYPE: CHRONIC PAIN

## 2019-01-30 ASSESSMENT — PAIN SCALES - GENERAL: PAINLEVEL_OUTOF10: 8

## 2019-01-30 ASSESSMENT — PAIN DESCRIPTION - ORIENTATION: ORIENTATION: LEFT

## 2019-02-08 ENCOUNTER — HOSPITAL ENCOUNTER (OUTPATIENT)
Dept: WOUND CARE | Age: 49
Discharge: HOME | End: 2019-02-08
Payer: COMMERCIAL

## 2019-02-08 VITALS
SYSTOLIC BLOOD PRESSURE: 152 MMHG | TEMPERATURE: 97 F | DIASTOLIC BLOOD PRESSURE: 100 MMHG | HEART RATE: 71 BPM | RESPIRATION RATE: 16 BRPM

## 2019-02-08 PROCEDURE — 87077 CULTURE AEROBIC IDENTIFY: CPT

## 2019-02-08 PROCEDURE — 11043 DBRDMT MUSC&/FSCA 1ST 20/<: CPT

## 2019-02-08 PROCEDURE — 87070 CULTURE OTHR SPECIMN AEROBIC: CPT

## 2019-02-08 PROCEDURE — 87205 SMEAR GRAM STAIN: CPT

## 2019-02-08 PROCEDURE — 87186 SC STD MICRODIL/AGAR DIL: CPT

## 2019-02-08 RX ORDER — LIDOCAINE 40 MG/G
CREAM TOPICAL PRN
Status: DISCONTINUED | OUTPATIENT
Start: 2019-02-08 | End: 2019-02-09 | Stop reason: HOSPADM

## 2019-02-08 RX ORDER — DOXYCYCLINE HYCLATE 100 MG/1
100 CAPSULE ORAL 2 TIMES DAILY
Qty: 28 CAPSULE | Refills: 0 | Status: SHIPPED | OUTPATIENT
Start: 2019-02-08 | End: 2019-02-22

## 2019-02-08 ASSESSMENT — PAIN DESCRIPTION - PAIN TYPE: TYPE: CHRONIC PAIN

## 2019-02-08 ASSESSMENT — PAIN SCALES - GENERAL: PAINLEVEL_OUTOF10: 5

## 2019-02-08 ASSESSMENT — PAIN DESCRIPTION - LOCATION: LOCATION: GENERALIZED

## 2019-02-10 LAB
GRAM STAIN RESULT: ABNORMAL
ORGANISM: ABNORMAL
ORGANISM: ABNORMAL
WOUND/ABSCESS: ABNORMAL

## 2019-02-13 ENCOUNTER — HOSPITAL ENCOUNTER (OUTPATIENT)
Dept: WOUND CARE | Age: 49
Discharge: HOME | End: 2019-02-13
Payer: COMMERCIAL

## 2019-02-13 VITALS
DIASTOLIC BLOOD PRESSURE: 102 MMHG | RESPIRATION RATE: 16 BRPM | HEART RATE: 87 BPM | SYSTOLIC BLOOD PRESSURE: 186 MMHG | TEMPERATURE: 98.2 F

## 2019-02-13 PROCEDURE — 11043 DBRDMT MUSC&/FSCA 1ST 20/<: CPT

## 2019-02-13 RX ORDER — LIDOCAINE 40 MG/G
CREAM TOPICAL PRN
Status: DISCONTINUED | OUTPATIENT
Start: 2019-02-13 | End: 2019-02-14 | Stop reason: HOSPADM

## 2019-02-13 ASSESSMENT — PAIN SCALES - GENERAL: PAINLEVEL_OUTOF10: 0

## 2019-02-20 ENCOUNTER — HOSPITAL ENCOUNTER (OUTPATIENT)
Dept: WOUND CARE | Age: 49
Discharge: HOME | End: 2019-02-20
Payer: COMMERCIAL

## 2019-02-20 VITALS
RESPIRATION RATE: 16 BRPM | DIASTOLIC BLOOD PRESSURE: 92 MMHG | SYSTOLIC BLOOD PRESSURE: 143 MMHG | HEART RATE: 84 BPM | TEMPERATURE: 97.2 F

## 2019-02-20 DIAGNOSIS — L89.893 PRESSURE ULCER OF LEFT FOOT, STAGE 3 (HCC): Primary | ICD-10-CM

## 2019-02-20 PROCEDURE — 87205 SMEAR GRAM STAIN: CPT

## 2019-02-20 PROCEDURE — 11043 DBRDMT MUSC&/FSCA 1ST 20/<: CPT

## 2019-02-20 PROCEDURE — 87070 CULTURE OTHR SPECIMN AEROBIC: CPT

## 2019-02-20 PROCEDURE — 87077 CULTURE AEROBIC IDENTIFY: CPT

## 2019-02-20 PROCEDURE — 87186 SC STD MICRODIL/AGAR DIL: CPT

## 2019-02-20 RX ORDER — LIDOCAINE 40 MG/G
CREAM TOPICAL PRN
Status: DISCONTINUED | OUTPATIENT
Start: 2019-02-20 | End: 2019-02-21 | Stop reason: HOSPADM

## 2019-02-20 RX ORDER — GENTAMICIN SULFATE 1 MG/G
CREAM TOPICAL
Qty: 15 G | Refills: 0 | Status: SHIPPED | OUTPATIENT
Start: 2019-02-20 | End: 2019-04-04 | Stop reason: SDUPTHER

## 2019-02-22 LAB
GRAM STAIN RESULT: ABNORMAL
ORGANISM: ABNORMAL
ORGANISM: ABNORMAL
WOUND/ABSCESS: ABNORMAL

## 2019-02-27 ENCOUNTER — HOSPITAL ENCOUNTER (OUTPATIENT)
Dept: WOUND CARE | Age: 49
Discharge: HOME | End: 2019-02-27
Payer: COMMERCIAL

## 2019-02-27 VITALS
WEIGHT: 148 LBS | DIASTOLIC BLOOD PRESSURE: 85 MMHG | HEART RATE: 78 BPM | SYSTOLIC BLOOD PRESSURE: 130 MMHG | RESPIRATION RATE: 16 BRPM | TEMPERATURE: 98.2 F

## 2019-02-27 PROCEDURE — 11043 DBRDMT MUSC&/FSCA 1ST 20/<: CPT

## 2019-02-27 RX ORDER — AMLODIPINE BESYLATE 5 MG/1
5 TABLET ORAL DAILY
COMMUNITY

## 2019-02-27 RX ORDER — LIDOCAINE 40 MG/G
CREAM TOPICAL PRN
Status: DISCONTINUED | OUTPATIENT
Start: 2019-02-27 | End: 2019-02-28 | Stop reason: HOSPADM

## 2019-02-27 RX ORDER — HYDROCHLOROTHIAZIDE 12.5 MG/1
12.5 CAPSULE, GELATIN COATED ORAL DAILY
COMMUNITY

## 2019-02-27 RX ORDER — CIPROFLOXACIN 500 MG/1
500 TABLET, FILM COATED ORAL 2 TIMES DAILY
Qty: 28 TABLET | Refills: 0 | Status: SHIPPED | OUTPATIENT
Start: 2019-02-27 | End: 2019-03-13 | Stop reason: SDUPTHER

## 2019-03-06 ENCOUNTER — HOSPITAL ENCOUNTER (OUTPATIENT)
Dept: WOUND CARE | Age: 49
Discharge: HOME | End: 2019-03-06
Payer: COMMERCIAL

## 2019-03-06 VITALS
RESPIRATION RATE: 16 BRPM | HEART RATE: 82 BPM | SYSTOLIC BLOOD PRESSURE: 114 MMHG | DIASTOLIC BLOOD PRESSURE: 70 MMHG | TEMPERATURE: 98.2 F

## 2019-03-06 PROCEDURE — 11043 DBRDMT MUSC&/FSCA 1ST 20/<: CPT

## 2019-03-06 RX ORDER — LIDOCAINE 40 MG/G
CREAM TOPICAL PRN
Status: DISCONTINUED | OUTPATIENT
Start: 2019-03-06 | End: 2019-03-07 | Stop reason: HOSPADM

## 2019-03-06 ASSESSMENT — PAIN SCALES - GENERAL: PAINLEVEL_OUTOF10: 0

## 2019-03-13 ENCOUNTER — HOSPITAL ENCOUNTER (OUTPATIENT)
Dept: WOUND CARE | Age: 49
Discharge: HOME | End: 2019-03-13
Payer: COMMERCIAL

## 2019-03-13 VITALS
RESPIRATION RATE: 16 BRPM | TEMPERATURE: 98 F | SYSTOLIC BLOOD PRESSURE: 115 MMHG | DIASTOLIC BLOOD PRESSURE: 78 MMHG | HEART RATE: 91 BPM

## 2019-03-13 PROCEDURE — 11043 DBRDMT MUSC&/FSCA 1ST 20/<: CPT

## 2019-03-13 RX ORDER — CIPROFLOXACIN 500 MG/1
500 TABLET, FILM COATED ORAL 2 TIMES DAILY
Qty: 28 TABLET | Refills: 0 | Status: SHIPPED | OUTPATIENT
Start: 2019-03-13 | End: 2019-03-27

## 2019-03-13 RX ORDER — LIDOCAINE 40 MG/G
CREAM TOPICAL PRN
Status: DISCONTINUED | OUTPATIENT
Start: 2019-03-13 | End: 2019-03-14 | Stop reason: HOSPADM

## 2019-03-13 RX ORDER — AMMONIUM LACTATE 12 G/100G
CREAM TOPICAL
Qty: 1 TUBE | Refills: 4 | Status: SHIPPED | OUTPATIENT
Start: 2019-03-13

## 2019-03-18 ENCOUNTER — HOSPITAL ENCOUNTER (OUTPATIENT)
Dept: MRI IMAGING | Age: 49
Discharge: HOME | End: 2019-03-18
Payer: COMMERCIAL

## 2019-03-18 DIAGNOSIS — L89.893 PRESSURE ULCER OF LEFT FOOT, STAGE 3 (HCC): ICD-10-CM

## 2019-03-18 PROCEDURE — 73721 MRI JNT OF LWR EXTRE W/O DYE: CPT

## 2019-03-27 ENCOUNTER — HOSPITAL ENCOUNTER (OUTPATIENT)
Dept: WOUND CARE | Age: 49
Discharge: HOME | End: 2019-03-27
Payer: COMMERCIAL

## 2019-03-27 VITALS
TEMPERATURE: 97 F | HEART RATE: 95 BPM | SYSTOLIC BLOOD PRESSURE: 116 MMHG | DIASTOLIC BLOOD PRESSURE: 79 MMHG | WEIGHT: 148 LBS

## 2019-03-27 PROCEDURE — 11043 DBRDMT MUSC&/FSCA 1ST 20/<: CPT

## 2019-03-27 RX ORDER — LIDOCAINE 40 MG/G
CREAM TOPICAL PRN
Status: DISCONTINUED | OUTPATIENT
Start: 2019-03-27 | End: 2019-03-28 | Stop reason: HOSPADM

## 2019-04-04 ENCOUNTER — HOSPITAL ENCOUNTER (OUTPATIENT)
Dept: WOUND CARE | Age: 49
Discharge: HOME | End: 2019-04-04
Payer: COMMERCIAL

## 2019-04-04 VITALS
TEMPERATURE: 98.6 F | RESPIRATION RATE: 16 BRPM | SYSTOLIC BLOOD PRESSURE: 114 MMHG | HEART RATE: 73 BPM | DIASTOLIC BLOOD PRESSURE: 78 MMHG

## 2019-04-04 PROCEDURE — 11042 DBRDMT SUBQ TIS 1ST 20SQCM/<: CPT

## 2019-04-04 RX ORDER — GENTAMICIN SULFATE 1 MG/G
CREAM TOPICAL
Qty: 15 G | Refills: 0 | Status: SHIPPED | OUTPATIENT
Start: 2019-04-04 | End: 2019-05-01 | Stop reason: SDUPTHER

## 2019-04-04 ASSESSMENT — PAIN DESCRIPTION - LOCATION: LOCATION: BACK

## 2019-04-04 ASSESSMENT — PAIN DESCRIPTION - PAIN TYPE: TYPE: CHRONIC PAIN

## 2019-04-04 ASSESSMENT — PAIN SCALES - GENERAL: PAINLEVEL_OUTOF10: 4

## 2019-04-04 NOTE — PROGRESS NOTES
[facility]  Progress Note and Procedure Note      Salomón Stovall  AGE: 53 y.o. GENDER: female  : 1970  TODAY'S DATE:  2019    Subjective:     Chief Complaint   Patient presents with    Wound Check     left ankle         HISTORY of PRESENT ILLNESS HPI     Salomón Stovall is a 53 y.o. female who presents today for wound evaluation. History of Wound: She admits to getting the wound during the summer of    She states she's been changing the bandage every day. She has been taking the antibiotic as directed. Pt had MRI last week, no evidence of OM. She denies nausea, vomiting, fever, chills, shortness of breath or chest pain. Wound Pain:  none  Severity:  0 / 10   Wound Type:  pressure  Modifying Factors:  edema  Associated Signs/Symptoms:  edema        PAST MEDICAL HISTORY        Diagnosis Date    Hypertension     Neuromuscular disorder ([facility] [address]     L1 paralysis, accident       PAST SURGICAL HISTORY    Past Surgical History:   Procedure Laterality Date    SPINAL FUSION         FAMILY HISTORY    History reviewed. No pertinent family history.     SOCIAL HISTORY    Social History     Tobacco Use    Smoking status: Never Smoker    Smokeless tobacco: Never Used   Substance Use Topics    Alcohol use: Yes     Comment: socially    Drug use: No       ALLERGIES    Allergies   Allergen Reactions    Sulfa Antibiotics Shortness Of Breath       MEDICATIONS    Current Outpatient Medications on File Prior to Encounter   Medication Sig Dispense Refill    ammonium lactate (LAC-HYDRIN) 12 % cream Apply topically to dry skin on feet daily 1 Tube 4    amLODIPine (NORVASC) 5 MG tablet Take 5 mg by mouth daily      hydrochlorothiazide (MICROZIDE) 12.5 MG capsule Take 12.5 mg by mouth daily      losartan (COZAAR) 25 MG tablet Take 25 mg by mouth daily      atenolol (TENORMIN) 50 MG tablet Take 50 mg by mouth daily      traMADol (ULTRAM) 50 MG tablet Take 50 mg by mouth every 4 hours as needed for Pain. .       No current facility-administered medications on file prior to encounter. REVIEW OF SYSTEMS    Pertinent items are noted in HPI. Objective:      /78   Pulse 73   Temp 98.6 °F (37 °C) (Oral)   Resp 16     PHYSICAL EXAM    Vascular: Vascular status Intact  palpable pedal pulses, right DP1/4 and PT1/4, left DP1/4 and PT1/4. CFT 3 seconds digits 1 to 5 bilateral.  Hair growthPresent both lower extremities and feet. Skin temperature is warm to warm from pretibial area to distal digits bilateral.  Exam is negative for rubor, pallor, cyanosis or signs of acute vascular compromise bilaterally. Exam is positive for edema bilateral lower extremity. Varicosities Absent  bilateral lower extremity. Neuro: Neurologic status absent bilateral with epicritic Absent  , proprioceptive Absent , vibratory sensationAbsent  and protopathic Absent. DTRs Absent  bilateral Achilles. There were no reproducible neuritic symptoms on exam bilateral feet/ankles. Derm: Ulceration to left lateral heel. Ecchymosis Absent  bilateral feet/foot. Musculoskeletal: No pain with wound debridement 5/5 muscle strength in/eversion and dorsi/plantarflexion bilateral feet. No gross instability noted. Assessment:     Patient Active Problem List   Diagnosis    Pressure ulcer of left foot, stage 3 ([facility] [address]       Procedure Note    Performed by: Paul Salmon DPM    Consent obtained: Yes    Time out taken:  Yes    Pain Control: Anesthetic  Anesthetic: Other (comment)     Debridement:Excisional Debridement    Using curette the wound was sharply debrided    down through and including the removal of epidermis, dermis and subcutaneous tissue.         Devitalized Tissue Debrided:  fibrin, biofilm and slough    Pre Debridement Measurements:  Are located in the Wound Documentation Flow Sheet    Wound #: 1 and 2     Post  Debridement Measurements:  Wound 11/28/18 Ankle Left;Lateral #1 (Active) Wound Image   3/27/2019  1:11 PM   Wound Pressure Stage  2 4/4/2019  1:22 PM   Dressing Status Clean;Dry; Intact 2/8/2019  9:58 AM   Dressing Changed Changed/New 2/8/2019  9:58 AM   Dressing/Treatment Dry Dressing; Pharmaceutical agent (see MAR) 3/6/2019  2:32 PM   Wound Cleansed Rinsed/Irrigated with saline 4/4/2019  1:22 PM   Wound Length (cm) 2.5 cm 4/4/2019  1:22 PM   Wound Width (cm) 0.7 cm 4/4/2019  1:22 PM   Wound Depth (cm) 0.1 cm 4/4/2019  1:22 PM   Wound Surface Area (cm^2) 1.75 cm^2 4/4/2019  1:22 PM   Change in Wound Size % (l*w) -288.89 4/4/2019  1:22 PM   Wound Volume (cm^3) 0.18 cm^3 4/4/2019  1:22 PM   Wound Healing % -100 4/4/2019  1:22 PM   Post-Procedure Length (cm) 2.5 cm 4/4/2019  2:01 PM   Post-Procedure Width (cm) 0.7 cm 4/4/2019  2:01 PM   Post-Procedure Depth (cm) 0.1 cm 4/4/2019  2:01 PM   Post-Procedure Surface Area (cm^2) 1.75 cm^2 4/4/2019  2:01 PM   Post-Procedure Volume (cm^3) 0.18 cm^3 4/4/2019  2:01 PM   Wound Assessment Pink 4/4/2019  2:01 PM   Drainage Amount Scant 4/4/2019  1:22 PM   Drainage Description Serosanguinous 4/4/2019  1:22 PM   Odor None 4/4/2019  1:22 PM   Sarah-wound Assessment Brown 4/4/2019  1:22 PM   [place]%Wound Bed 100 4/4/2019  1:22 PM   Red%Wound Bed 0 4/4/2019  1:22 PM   Yellow%Wound Bed 0 4/4/2019  1:22 PM   Black%Wound Bed 0 4/4/2019  1:22 PM   Purple%Wound Bed 0 4/4/2019  1:22 PM   Other%Wound Bed 0 4/4/2019  1:22 PM   Number of days: 127       Wound 02/20/19 Foot Left;Posterior #2 (Active)   Wound Image   3/27/2019  1:11 PM   Wound Pressure Stage  3 4/4/2019  1:22 PM   Dressing Status Clean;Dry; Intact 2/27/2019 12:59 PM   Dressing Changed Changed/New 2/27/2019 12:59 PM   Dressing/Treatment Dry Dressing; Pharmaceutical agent (see MAR) 3/6/2019  2:32 PM   Wound Cleansed Rinsed/Irrigated with saline 4/4/2019  1:22 PM   Wound Length (cm) 0.4 cm 4/4/2019  1:22 PM   Wound Width (cm) 0.7 cm 4/4/2019  1:22 PM   Wound Depth (cm) 0.1 cm 4/4/2019  1:22 PM   Wound Surface Area (cm^2) 0.28 cm^2 4/4/2019  1:22 PM   Wound Volume (cm^3) 0.03 cm^3 4/4/2019  1:22 PM   Post-Procedure Length (cm) 0.4 cm 4/4/2019  2:01 PM   Post-Procedure Width (cm) 0.7 cm 4/4/2019  2:01 PM   Post-Procedure Depth (cm) 0.1 cm 4/4/2019  2:01 PM   Post-Procedure Surface Area (cm^2) 0.28 cm^2 4/4/2019  2:01 PM   Post-Procedure Volume (cm^3) 0.03 cm^3 4/4/2019  2:01 PM   Wound Assessment Pink 4/4/2019  2:01 PM   Drainage Amount Scant 4/4/2019  1:22 PM   Drainage Description Serosanguinous 4/4/2019  1:22 PM   Odor None 4/4/2019  1:22 PM   Sarah-wound Assessment Dry 4/4/2019  1:22 PM   [place]%Wound Bed 0 4/4/2019  1:22 PM   Red%Wound Bed 0 4/4/2019  1:22 PM   Yellow%Wound Bed 100 4/4/2019  1:22 PM   Black%Wound Bed 0 4/4/2019  1:22 PM   Purple%Wound Bed 0 4/4/2019  1:22 PM   Other%Wound Bed 0 4/4/2019  1:22 PM   Number of days: 42           Total Surface Area Debrided:  2 sq cm     Percentage of wound debrided 100%    Bleeding:  Minimal    Hemostasis Achieved:  by pressure    Procedural Pain:  0  / 10     Post Procedural Pain:  0 / 10     Response to treatment:  Well tolerated by patient. Plan:     The nature of the patient's condition was explained in depth. The patient was informed that their compliance to the treatment plan is paramount to successful healing and prevention of further ulceration and/or infection     Discharge Treatment       Patient examined and evaluated MRI reviewed. No evidence of OM  Wound debrided without incident  Necrosis has demarcated, some deep necrosis remains after debridement  Again Santyl with gentamicin cream  Continue offloading boot  Follow-up in 1 week     The nature of the patient's condition was explained in depth.  The patient was informed that their compliance to the treatment plan is paramount to successful healing and prevention of further ulceration and/or infection         Written Patient Discharge Instructions Given            Electronically signed by Lucas Mccoy Bruno Roman on 4/4/2019 at 2:03 PM

## 2019-04-19 ENCOUNTER — HOSPITAL ENCOUNTER (OUTPATIENT)
Dept: WOUND CARE | Age: 49
Discharge: HOME | End: 2019-04-19
Payer: COMMERCIAL

## 2019-04-19 VITALS — DIASTOLIC BLOOD PRESSURE: 86 MMHG | RESPIRATION RATE: 16 BRPM | HEART RATE: 76 BPM | SYSTOLIC BLOOD PRESSURE: 135 MMHG

## 2019-04-19 PROCEDURE — 11043 DBRDMT MUSC&/FSCA 1ST 20/<: CPT

## 2019-04-19 RX ORDER — LIDOCAINE 40 MG/G
CREAM TOPICAL PRN
Status: DISCONTINUED | OUTPATIENT
Start: 2019-04-19 | End: 2019-04-20 | Stop reason: HOSPADM

## 2019-04-19 ASSESSMENT — PAIN SCALES - GENERAL: PAINLEVEL_OUTOF10: 7

## 2019-04-19 ASSESSMENT — PAIN DESCRIPTION - PAIN TYPE: TYPE: CHRONIC PAIN

## 2019-04-19 ASSESSMENT — PAIN DESCRIPTION - LOCATION: LOCATION: BACK;LEG

## 2019-04-19 NOTE — PROGRESS NOTES
[facility]  Progress Note and Procedure Note      Gerri Urias  AGE: 53 y.o. GENDER: female  : 1970  TODAY'S DATE:  2019    Subjective:     Chief Complaint   Patient presents with    Wound Check     Left foot, Left ankle         HISTORY of PRESENT ILLNESS HPI     Gerri Urias is a 53 y.o. female who presents today for wound evaluation. History of Wound: Patient is paraplegic. She admits to getting the wound during the summer of    She states the wounds of been getting better in the last 2 weeks. She has been changing the bandage as directed. She's been keeping pressure off of it. She denies nausea, vomiting, fever, chills, shortness of breath or chest pain. Wound Pain:  none  Severity:  0 / 10   Wound Type:  pressure  Modifying Factors:  edema  Associated Signs/Symptoms:  edema and drainage        PAST MEDICAL HISTORY        Diagnosis Date    Hypertension     Neuromuscular disorder ([facility] [address]     L1 paralysis, accident       PAST SURGICAL HISTORY    Past Surgical History:   Procedure Laterality Date    SPINAL FUSION         FAMILY HISTORY    History reviewed. No pertinent family history.     SOCIAL HISTORY    Social History     Tobacco Use    Smoking status: Never Smoker    Smokeless tobacco: Never Used   Substance Use Topics    Alcohol use: Yes     Comment: socially    Drug use: No       ALLERGIES    Allergies   Allergen Reactions    Sulfa Antibiotics Shortness Of Breath       MEDICATIONS    Current Outpatient Medications on File Prior to Encounter   Medication Sig Dispense Refill    gentamicin (GARAMYCIN) 0.1 % cream Apply topically daily 15 g 0    ammonium lactate (LAC-HYDRIN) 12 % cream Apply topically to dry skin on feet daily 1 Tube 4    amLODIPine (NORVASC) 5 MG tablet Take 5 mg by mouth daily      hydrochlorothiazide (MICROZIDE) 12.5 MG capsule Take 12.5 mg by mouth daily      losartan (COZAAR) 25 MG tablet Take 25 mg by mouth daily      atenolol (TENORMIN) 50 MG tablet Take 50 mg by mouth daily      traMADol (ULTRAM) 50 MG tablet Take 50 mg by mouth every 4 hours as needed for Pain. .       No current facility-administered medications on file prior to encounter. REVIEW OF SYSTEMS    Pertinent items are noted in HPI. Objective:      /86   Pulse 76   Resp 16     PHYSICAL EXAM    Vascular: Vascular status Intact  palpable pedal pulses, right DP1/4 and PT1/4, left DP1/4 and PT1/4. CFT 3 seconds digits 1 to 5 bilateral.  Hair growthPresent both lower extremities and feet. Skin temperature is warm to warm from pretibial area to distal digits bilateral.  Exam is negative for rubor, pallor, cyanosis or signs of acute vascular compromise bilaterally. Exam is positive for edema bilateral lower extremity. Varicosities Absent  bilateral lower extremity. Neuro: Neurologic status absent bilateral with epicritic Absent  , proprioceptive Absent , vibratory sensationAbsent  and protopathic Absent. DTRs Absent  bilateral Achilles. There were no reproducible neuritic symptoms on exam bilateral feet/ankles. Derm: Ulceration to left lateral heel. Ecchymosis Absent  bilateral feet/foot. Musculoskeletal: No pain with wound debridement 5/5 muscle strength in/eversion and dorsi/plantarflexion bilateral feet. No gross instability noted. Assessment:     Patient Active Problem List   Diagnosis    Pressure ulcer of left foot, stage 3 ([facility] [address]     Procedure Note    Performed by: Andrew Bennett DPM    Consent obtained: Yes    Time out taken:  Yes    Pain Control: Anesthetic  Anesthetic: 4% Topical Xylocaine     Debridement:Excisional Debridement    Using curette the wound was sharply debrided    down through and including the removal of epidermis, dermis, subcutaneous tissue and muscle/fascia.         Devitalized Tissue Debrided:  fibrin, biofilm, slough, exudate and callus    Pre Debridement Measurements:  Are located in the Wound Documentation Flow Sheet  Wound Care Documentation:  Wound 11/28/18 Ankle Left;Lateral #1 (Active)   Wound Image   3/27/2019  1:11 PM   Wound Pressure Stage  2 3/27/2019  1:11 PM   Dressing Status Clean;Dry; Intact 2/8/2019  9:58 AM   Dressing Changed Changed/New 2/8/2019  9:58 AM   Dressing/Treatment Dry Dressing; Pharmaceutical agent (see MAR) 3/6/2019  2:32 PM   Wound Cleansed Rinsed/Irrigated with saline 3/27/2019  1:11 PM   Wound Length (cm) 2.5 cm 3/27/2019  1:11 PM   Wound Width (cm) 1.3 cm 3/27/2019  1:11 PM   Wound Depth (cm) 0.1 cm 3/27/2019  1:11 PM   Wound Surface Area (cm^2) 3.25 cm^2 3/27/2019  1:11 PM   Change in Wound Size % (l*w) -622.22 3/27/2019  1:11 PM   Wound Volume (cm^3) 0.32 cm^3 3/27/2019  1:11 PM   Wound Healing % -256 3/27/2019  1:11 PM   Post-Procedure Length (cm) 2.5 cm 3/27/2019  1:36 PM   Post-Procedure Width (cm) 1.3 cm 3/27/2019  1:36 PM   Post-Procedure Depth (cm) 0.1 cm 3/27/2019  1:36 PM   Post-Procedure Surface Area (cm^2) 3.25 cm^2 3/27/2019  1:36 PM   Post-Procedure Volume (cm^3) 0.32 cm^3 3/27/2019  1:36 PM   Wound Assessment Bleeding 3/27/2019  1:36 PM   Drainage Amount Small 3/27/2019  1:11 PM   Drainage Description Serosanguinous 3/27/2019  1:11 PM   Odor None 3/27/2019  1:11 PM   Sarah-wound Assessment Brown 3/13/2019  2:52 PM   [place]%Wound Bed 30 3/27/2019  1:11 PM   Red%Wound Bed 0 3/27/2019  1:11 PM   Yellow%Wound Bed 70 3/27/2019  1:11 PM   Black%Wound Bed 0 3/27/2019  1:11 PM   Purple%Wound Bed 0 3/27/2019  1:11 PM   Other%Wound Bed 0 3/27/2019  1:11 PM   Number of days: 119       Wound 02/20/19 Foot Left;Posterior #2 (Active)   Wound Image   3/27/2019  1:11 PM   Wound Pressure Stage  3 3/27/2019  1:11 PM   Dressing Status Clean;Dry; Intact 2/27/2019 12:59 PM   Dressing Changed Changed/New 2/27/2019 12:59 PM   Dressing/Treatment Dry Dressing; Pharmaceutical agent (see MAR) 3/6/2019  2:32 PM   Wound Cleansed Rinsed/Irrigated with saline 3/27/2019  1:11 PM   Wound Length (cm) 0.4 cm 3/27/2019  1:11 PM   Wound Width (cm) 0.7 cm 3/27/2019  1:11 PM   Wound Depth (cm) 0.1 cm 3/27/2019  1:11 PM   Wound Surface Area (cm^2) 0.28 cm^2 3/27/2019  1:11 PM   Wound Volume (cm^3) 0.03 cm^3 3/27/2019  1:11 PM   Post-Procedure Length (cm) 0.4 cm 3/13/2019  3:19 PM   Post-Procedure Width (cm) 0.7 cm 3/13/2019  3:19 PM   Post-Procedure Depth (cm) 0.1 cm 3/13/2019  3:19 PM   Post-Procedure Surface Area (cm^2) 0.28 cm^2 3/13/2019  3:19 PM   Post-Procedure Volume (cm^3) 0.03 cm^3 3/13/2019  3:19 PM   Wound Assessment Yellow 3/27/2019  1:11 PM   Drainage Amount Scant 3/27/2019  1:11 PM   Drainage Description Serosanguinous 3/27/2019  1:11 PM   Odor None 3/27/2019  1:11 PM   Sarah-wound Assessment Dry 3/27/2019  1:11 PM   [place]%Wound Bed 0 3/27/2019  1:11 PM   Red%Wound Bed 0 3/27/2019  1:11 PM   Yellow%Wound Bed 100 3/27/2019  1:11 PM   Black%Wound Bed 0 3/27/2019  1:11 PM   Purple%Wound Bed 0 3/27/2019  1:11 PM   Other%Wound Bed 0 3/27/2019  1:11 PM   Number of days: 35       Total Surface Area Debrided:  4sq cm     Percentage of wound debrided 100%    Bleeding:  Minimal    Hemostasis Achieved:  by pressure    Procedural Pain:  0  / 10     Post Procedural Pain:  0 / 10     Response to treatment:  Well tolerated by patient. Plan:   Patient examined and evaluated  Wound debrided without incident  Necrosis has demarcated, some deep necrosis remains after debridement  Again Santyl with gentamicin cream  Continue offloading boot  Follow-up in 1 week    The nature of the patient's condition was explained in depth.  The patient was informed that their compliance to the treatment plan is paramount to successful healing and prevention of further ulceration and/or infection     Discharge Treatment : Daily dressing changes with antibiotic ointment    Written Patient Discharge Instructions Given            Electronically signed by Richard Harper DPM on 4/19/2019 at 12:59 PM  [address]  Progress Note and Procedure Note      Gerri Urias  AGE: 53 y.o. GENDER: female  : 1970  TODAY'S DATE:  2019    Subjective:     Chief Complaint   Patient presents with    Wound Check     Left foot, Left ankle         HISTORY of PRESENT ILLNESS HPI     Gerri Urias is a 53 y.o. female who presents today for wound evaluation. History of Wound: Patient is paraplegic. She admits to getting the wound during the summer of    She states she's been changing the bandage every day. She has been taking the antibiotic as directed. She did not get the MRI yet. She has scheduled for Friday. She thinks that the wound might be getting worse because her foot is getting twisted in her offloading boot at night and getting pressure. She denies nausea, vomiting, fever, chills, shortness of breath or chest pain. Wound Pain:  none  Severity:  0 / 10   Wound Type:  pressure  Modifying Factors:  edema  Associated Signs/Symptoms:  edema and drainage        PAST MEDICAL HISTORY        Diagnosis Date    Hypertension     Neuromuscular disorder ([facility] [address]     L1 paralysis, accident       PAST SURGICAL HISTORY    Past Surgical History:   Procedure Laterality Date    SPINAL FUSION         FAMILY HISTORY    History reviewed. No pertinent family history.     SOCIAL HISTORY    Social History     Tobacco Use    Smoking status: Never Smoker    Smokeless tobacco: Never Used   Substance Use Topics    Alcohol use: Yes     Comment: socially    Drug use: No       ALLERGIES    Allergies   Allergen Reactions    Sulfa Antibiotics Shortness Of Breath       MEDICATIONS    Current Outpatient Medications on File Prior to Encounter   Medication Sig Dispense Refill    gentamicin (GARAMYCIN) 0.1 % cream Apply topically daily 15 g 0    ammonium lactate (LAC-HYDRIN) 12 % cream Apply topically to dry skin on feet daily 1 Tube 4    amLODIPine (NORVASC) 5 MG tablet Take 5 mg by mouth daily      hydrochlorothiazide (MICROZIDE) 12.5 MG capsule Take 12.5 mg by mouth daily      losartan (COZAAR) 25 MG tablet Take 25 mg by mouth daily      atenolol (TENORMIN) 50 MG tablet Take 50 mg by mouth daily      traMADol (ULTRAM) 50 MG tablet Take 50 mg by mouth every 4 hours as needed for Pain. .       No current facility-administered medications on file prior to encounter. REVIEW OF SYSTEMS    Pertinent items are noted in HPI. Objective:      /86   Pulse 76   Resp 16     PHYSICAL EXAM    Vascular: Vascular status Intact  palpable pedal pulses, right DP1/4 and PT1/4, left DP1/4 and PT1/4. CFT 3 seconds digits 1 to 5 bilateral.  Hair growthPresent both lower extremities and feet. Skin temperature is warm to warm from pretibial area to distal digits bilateral.  Exam is negative for rubor, pallor, cyanosis or signs of acute vascular compromise bilaterally. Exam is positive for edema bilateral lower extremity. Varicosities Absent  bilateral lower extremity. Neuro: Neurologic status absent bilateral with epicritic Absent  , proprioceptive Absent , vibratory sensationAbsent  and protopathic Absent. DTRs Absent  bilateral Achilles. There were no reproducible neuritic symptoms on exam bilateral feet/ankles. Derm: Ulceration to left lateral heel. Ecchymosis Absent  bilateral feet/foot. Musculoskeletal: No pain with wound debridement 5/5 muscle strength in/eversion and dorsi/plantarflexion bilateral feet. No gross instability noted. Assessment:     Patient Active Problem List   Diagnosis    Pressure ulcer of left foot, stage 3 ([facility] [address]     Procedure Note    Performed by: Tracie Diaz DPM    Consent obtained: Yes    Time out taken:  Yes    Pain Control: Anesthetic  Anesthetic: 4% Topical Xylocaine     Debridement:Excisional Debridement    Using curette the wound was sharply debrided    down through and including the removal of epidermis, dermis, subcutaneous tissue and muscle/fascia.         Devitalized Tissue Debrided:  fibrin, biofilm, slough, exudate and callus    Pre Debridement Measurements:  Are located in the Wound Documentation Flow Sheet  Wound Care Documentation:  Wound 11/28/18 Ankle Left;Lateral #1 (Active)   Wound Image   3/27/2019  1:11 PM   Wound Pressure Stage  2 4/19/2019 11:47 AM   Dressing Status Clean;Dry; Intact 4/19/2019 12:29 PM   Dressing Changed Changed/New 4/19/2019 12:29 PM   Dressing/Treatment Antibacterial Ointment;Dry Dressing; Pharmaceutical agent (see MAR) 4/19/2019 12:29 PM   Wound Cleansed Rinsed/Irrigated with saline 4/19/2019 11:47 AM   Wound Length (cm) 2.5 cm 4/19/2019 11:47 AM   Wound Width (cm) 0.7 cm 4/19/2019 11:47 AM   Wound Depth (cm) 0.1 cm 4/19/2019 11:47 AM   Wound Surface Area (cm^2) 1.75 cm^2 4/19/2019 11:47 AM   Change in Wound Size % (l*w) -288.89 4/19/2019 11:47 AM   Wound Volume (cm^3) 0.18 cm^3 4/19/2019 11:47 AM   Wound Healing % -100 4/19/2019 11:47 AM   Post-Procedure Length (cm) 2.5 cm 4/19/2019 12:09 PM   Post-Procedure Width (cm) 0.7 cm 4/19/2019 12:09 PM   Post-Procedure Depth (cm) 0.1 cm 4/19/2019 12:09 PM   Post-Procedure Surface Area (cm^2) 1.75 cm^2 4/19/2019 12:09 PM   Post-Procedure Volume (cm^3) 0.18 cm^3 4/19/2019 12:09 PM   Wound Assessment Bleeding 4/19/2019 12:09 PM   Drainage Amount Small 4/19/2019 11:47 AM   Drainage Description Serosanguinous 4/19/2019 11:47 AM   Odor None 4/19/2019 11:47 AM   Sarah-wound Assessment Dry 4/19/2019 11:47 AM   [place]%Wound Bed 20 4/19/2019 11:47 AM   Red%Wound Bed 0 4/19/2019 11:47 AM   Yellow%Wound Bed 80 4/19/2019 11:47 AM   Black%Wound Bed 0 4/19/2019 11:47 AM   Purple%Wound Bed 0 4/19/2019 11:47 AM   Other%Wound Bed 0 4/19/2019 11:47 AM   Number of days: 142       Wound 02/20/19 Foot Left;Posterior #2 (Active)   Wound Image   3/27/2019  1:11 PM   Wound Pressure Stage  3 4/19/2019 11:47 AM   Dressing Status Clean;Dry; Intact 4/19/2019 12:29 PM   Dressing Changed Changed/New 4/19/2019 12:29 PM   Dressing/Treatment Antibacterial Ointment;Dry Dressing; Pharmaceutical agent (see MAR) 4/19/2019 12:29 PM   Wound Cleansed Rinsed/Irrigated with saline 4/19/2019 11:47 AM   Wound Length (cm) 0.3 cm 4/19/2019 11:47 AM   Wound Width (cm) 0.4 cm 4/19/2019 11:47 AM   Wound Depth (cm) 0.1 cm 4/19/2019 11:47 AM   Wound Surface Area (cm^2) 0.12 cm^2 4/19/2019 11:47 AM   Wound Volume (cm^3) 0.01 cm^3 4/19/2019 11:47 AM   Post-Procedure Length (cm) 0.3 cm 4/19/2019 12:09 PM   Post-Procedure Width (cm) 0.4 cm 4/19/2019 12:09 PM   Post-Procedure Depth (cm) 0.1 cm 4/19/2019 12:09 PM   Post-Procedure Surface Area (cm^2) 0.12 cm^2 4/19/2019 12:09 PM   Post-Procedure Volume (cm^3) 0.01 cm^3 4/19/2019 12:09 PM   Wound Assessment Bleeding 4/19/2019 12:09 PM   Drainage Amount Scant 4/19/2019 11:47 AM   Drainage Description Serosanguinous 4/19/2019 11:47 AM   Odor None 4/19/2019 11:47 AM   Sarah-wound Assessment Dry;Pink 4/19/2019 11:47 AM   [place]%Wound Bed 0 4/19/2019 11:47 AM   Red%Wound Bed 0 4/19/2019 11:47 AM   Yellow%Wound Bed 100 4/19/2019 11:47 AM   Black%Wound Bed 0 4/19/2019 11:47 AM   Purple%Wound Bed 0 4/19/2019 11:47 AM   Other%Wound Bed 0 4/19/2019 11:47 AM   Number of days: 57         Total Surface Area Debrided:  4sq cm     Percentage of wound debrided 100%    Bleeding:  Minimal    Hemostasis Achieved:  by pressure    Procedural Pain:  0  / 10     Post Procedural Pain:  0 / 10     Response to treatment:  Well tolerated by patient. Plan:   Patient examined and evaluated  All wounds improved since I have last seen her  Continue Santyl with gentamicin cream  Continue offloading boot  Follow-up in 1 week    The nature of the patient's condition was explained in depth.  The patient was informed that their compliance to the treatment plan is paramount to successful healing and prevention of further ulceration and/or infection     Discharge Treatment : Daily dressing changes with antibiotic ointment    Written Patient Discharge Instructions Given            Electronically signed by Kendrick Grewal DPM on 4/19/2019 at 12:59 PM

## 2019-04-19 NOTE — PLAN OF CARE
Discharge instructions given. Patient verbalized understanding. Return to [facility] in 1 week.   Workers comp approved through 6/1/2019

## 2019-04-26 ENCOUNTER — HOSPITAL ENCOUNTER (OUTPATIENT)
Dept: WOUND CARE | Age: 49
Discharge: HOME | End: 2019-04-26

## 2019-05-01 ENCOUNTER — HOSPITAL ENCOUNTER (OUTPATIENT)
Dept: WOUND CARE | Age: 49
Discharge: HOME | End: 2019-05-01
Payer: COMMERCIAL

## 2019-05-01 VITALS
RESPIRATION RATE: 16 BRPM | DIASTOLIC BLOOD PRESSURE: 85 MMHG | TEMPERATURE: 98.1 F | HEART RATE: 75 BPM | OXYGEN SATURATION: 100 % | SYSTOLIC BLOOD PRESSURE: 137 MMHG

## 2019-05-01 PROCEDURE — 11043 DBRDMT MUSC&/FSCA 1ST 20/<: CPT

## 2019-05-01 RX ORDER — LIDOCAINE 40 MG/G
CREAM TOPICAL PRN
Status: DISCONTINUED | OUTPATIENT
Start: 2019-05-01 | End: 2019-05-02 | Stop reason: HOSPADM

## 2019-05-01 RX ORDER — GENTAMICIN SULFATE 1 MG/G
CREAM TOPICAL
Qty: 15 G | Refills: 1 | Status: SHIPPED | OUTPATIENT
Start: 2019-05-01

## 2019-05-01 ASSESSMENT — PAIN DESCRIPTION - LOCATION: LOCATION: BACK;LEG

## 2019-05-01 ASSESSMENT — PAIN SCALES - GENERAL: PAINLEVEL_OUTOF10: 6

## 2019-05-01 ASSESSMENT — PAIN DESCRIPTION - ORIENTATION: ORIENTATION: LEFT;RIGHT;LOWER

## 2019-05-01 ASSESSMENT — PAIN DESCRIPTION - PAIN TYPE: TYPE: CHRONIC PAIN

## 2019-05-01 NOTE — PLAN OF CARE
Discharge instructions given. Patient verbalized understanding. Return to [facility] in 1 week.

## 2019-05-01 NOTE — PROGRESS NOTES
[facility]  Progress Note and Procedure Note      Selena Barksdale  AGE: 53 y.o. GENDER: female  : 1970  TODAY'S DATE:  2019    Subjective:     Chief Complaint   Patient presents with    Wound Check     LLE         HISTORY of PRESENT ILLNESS HPI     Selena Barksdale is a 53 y.o. female who presents today for wound evaluation. History of Wound: Patient is paraplegic. She admits to getting the wound during the summer of    She states the wounds of been getting better in the last 2 weeks. She has been changing the bandage as directed. She's been keeping pressure off of it. She denies nausea, vomiting, fever, chills, shortness of breath or chest pain. Wound Pain:  none  Severity:  0 / 10   Wound Type:  pressure  Modifying Factors:  edema  Associated Signs/Symptoms:  edema and drainage        PAST MEDICAL HISTORY        Diagnosis Date    Hypertension     Neuromuscular disorder ([facility] [address]     L1 paralysis, accident       PAST SURGICAL HISTORY    Past Surgical History:   Procedure Laterality Date    SPINAL FUSION         FAMILY HISTORY    No family history on file.     SOCIAL HISTORY    Social History     Tobacco Use    Smoking status: Never Smoker    Smokeless tobacco: Never Used   Substance Use Topics    Alcohol use: Yes     Comment: socially    Drug use: No       ALLERGIES    Allergies   Allergen Reactions    Sulfa Antibiotics Shortness Of Breath       MEDICATIONS    Current Outpatient Medications on File Prior to Encounter   Medication Sig Dispense Refill    ammonium lactate (LAC-HYDRIN) 12 % cream Apply topically to dry skin on feet daily 1 Tube 4    amLODIPine (NORVASC) 5 MG tablet Take 5 mg by mouth daily      hydrochlorothiazide (MICROZIDE) 12.5 MG capsule Take 12.5 mg by mouth daily      losartan (COZAAR) 25 MG tablet Take 25 mg by mouth daily      atenolol (TENORMIN) 50 MG tablet Take 50 mg by mouth daily      traMADol (ULTRAM) 50 MG tablet Take 50 mg by mouth every 4 hours as needed for Pain. .       No current facility-administered medications on file prior to encounter. REVIEW OF SYSTEMS    Pertinent items are noted in HPI. Objective:      /85   Pulse 75   Temp 98.1 °F (36.7 °C) (Oral)   Resp 16   SpO2 100%     PHYSICAL EXAM    Vascular: Vascular status Intact  palpable pedal pulses, right DP1/4 and PT1/4, left DP1/4 and PT1/4. CFT 3 seconds digits 1 to 5 bilateral.  Hair growthPresent both lower extremities and feet. Skin temperature is warm to warm from pretibial area to distal digits bilateral.  Exam is negative for rubor, pallor, cyanosis or signs of acute vascular compromise bilaterally. Exam is positive for edema bilateral lower extremity. Varicosities Absent  bilateral lower extremity. Neuro: Neurologic status absent bilateral with epicritic Absent  , proprioceptive Absent , vibratory sensationAbsent  and protopathic Absent. DTRs Absent  bilateral Achilles. There were no reproducible neuritic symptoms on exam bilateral feet/ankles. Derm: Ulceration to left lateral heel. Ecchymosis Absent  bilateral feet/foot. Musculoskeletal: No pain with wound debridement 5/5 muscle strength in/eversion and dorsi/plantarflexion bilateral feet. No gross instability noted. Assessment:     Patient Active Problem List   Diagnosis    Pressure ulcer of left foot, stage 3 ([facility] [address]     Procedure Note    Performed by: Dominguez Chung DPM    Consent obtained: Yes    Time out taken:  Yes    Pain Control: Anesthetic  Anesthetic: 4% Topical Xylocaine     Debridement:Excisional Debridement    Using curette the wound was sharply debrided    down through and including the removal of epidermis, dermis, subcutaneous tissue and muscle/fascia.         Devitalized Tissue Debrided:  fibrin, biofilm, slough, exudate and callus    Pre Debridement Measurements:  Are located in the Wound Documentation Flow Sheet  Wound Care Documentation:  Wound 3/27/2019  1:11 PM   Wound Depth (cm) 0.1 cm 3/27/2019  1:11 PM   Wound Surface Area (cm^2) 0.28 cm^2 3/27/2019  1:11 PM   Wound Volume (cm^3) 0.03 cm^3 3/27/2019  1:11 PM   Post-Procedure Length (cm) 0.4 cm 3/13/2019  3:19 PM   Post-Procedure Width (cm) 0.7 cm 3/13/2019  3:19 PM   Post-Procedure Depth (cm) 0.1 cm 3/13/2019  3:19 PM   Post-Procedure Surface Area (cm^2) 0.28 cm^2 3/13/2019  3:19 PM   Post-Procedure Volume (cm^3) 0.03 cm^3 3/13/2019  3:19 PM   Wound Assessment Yellow 3/27/2019  1:11 PM   Drainage Amount Scant 3/27/2019  1:11 PM   Drainage Description Serosanguinous 3/27/2019  1:11 PM   Odor None 3/27/2019  1:11 PM   Sarah-wound Assessment Dry 3/27/2019  1:11 PM   [place]%Wound Bed 0 3/27/2019  1:11 PM   Red%Wound Bed 0 3/27/2019  1:11 PM   Yellow%Wound Bed 100 3/27/2019  1:11 PM   Black%Wound Bed 0 3/27/2019  1:11 PM   Purple%Wound Bed 0 3/27/2019  1:11 PM   Other%Wound Bed 0 3/27/2019  1:11 PM   Number of days: 35       Total Surface Area Debrided:  4sq cm     Percentage of wound debrided 100%    Bleeding:  Minimal    Hemostasis Achieved:  by pressure    Procedural Pain:  0  / 10     Post Procedural Pain:  0 / 10     Response to treatment:  Well tolerated by patient. Plan:   Patient examined and evaluated  Wound debrided without incident  Necrosis has demarcated, some deep necrosis remains after debridement  Again Santyl with gentamicin cream  Continue offloading boot  Follow-up in 1 week    The nature of the patient's condition was explained in depth.  The patient was informed that their compliance to the treatment plan is paramount to successful healing and prevention of further ulceration and/or infection     Discharge Treatment : Daily dressing changes with antibiotic ointment    Written Patient Discharge Instructions Given            Electronically signed by Jessica Barrow DPM on 5/1/2019 at 5:19 PM  [facility]  Progress Note and Procedure Note      Porsche Becerra Dmitry  AGE: 53 y.o. GENDER: female  : 1970  TODAY'S DATE:  2019    Subjective:     Chief Complaint   Patient presents with    Wound Check     LLE         HISTORY of PRESENT ILLNESS BRAYAN Tanner is a 53 y.o. female who presents today for wound evaluation. History of Wound: Patient is paraplegic. She admits to getting the wound during the summer of    She states she's been changing the bandage every day. She missed her last appointment. She states there is been no problems since her last visit. She denies nausea, vomiting, fever, chills, shortness of breath or chest pain. Wound Pain:  none  Severity:  0 / 10   Wound Type:  pressure  Modifying Factors:  edema  Associated Signs/Symptoms:  edema and drainage        PAST MEDICAL HISTORY        Diagnosis Date    Hypertension     Neuromuscular disorder ([facility] [address]     L1 paralysis, accident       PAST SURGICAL HISTORY    Past Surgical History:   Procedure Laterality Date    SPINAL FUSION         FAMILY HISTORY    No family history on file. SOCIAL HISTORY    Social History     Tobacco Use    Smoking status: Never Smoker    Smokeless tobacco: Never Used   Substance Use Topics    Alcohol use: Yes     Comment: socially    Drug use: No       ALLERGIES    Allergies   Allergen Reactions    Sulfa Antibiotics Shortness Of Breath       MEDICATIONS    Current Outpatient Medications on File Prior to Encounter   Medication Sig Dispense Refill    ammonium lactate (LAC-HYDRIN) 12 % cream Apply topically to dry skin on feet daily 1 Tube 4    amLODIPine (NORVASC) 5 MG tablet Take 5 mg by mouth daily      hydrochlorothiazide (MICROZIDE) 12.5 MG capsule Take 12.5 mg by mouth daily      losartan (COZAAR) 25 MG tablet Take 25 mg by mouth daily      atenolol (TENORMIN) 50 MG tablet Take 50 mg by mouth daily      traMADol (ULTRAM) 50 MG tablet Take 50 mg by mouth every 4 hours as needed for Pain. .       No current facility-administered medications on file prior to encounter. REVIEW OF SYSTEMS    Pertinent items are noted in HPI. Objective:      /85   Pulse 75   Temp 98.1 °F (36.7 °C) (Oral)   Resp 16   SpO2 100%     PHYSICAL EXAM    Vascular: Vascular status Intact  palpable pedal pulses, right DP1/4 and PT1/4, left DP1/4 and PT1/4. CFT 3 seconds digits 1 to 5 bilateral.  Hair growthPresent both lower extremities and feet. Skin temperature is warm to warm from pretibial area to distal digits bilateral.  Exam is negative for rubor, pallor, cyanosis or signs of acute vascular compromise bilaterally. Exam is positive for edema bilateral lower extremity. Varicosities Absent  bilateral lower extremity. Neuro: Neurologic status absent bilateral with epicritic Absent  , proprioceptive Absent , vibratory sensationAbsent  and protopathic Absent. DTRs Absent  bilateral Achilles. There were no reproducible neuritic symptoms on exam bilateral feet/ankles. Derm: Ulceration to left lateral heel. Ecchymosis Absent  bilateral feet/foot. Musculoskeletal: No pain with wound debridement 5/5 muscle strength in/eversion and dorsi/plantarflexion bilateral feet. No gross instability noted. Assessment:     Patient Active Problem List   Diagnosis    Pressure ulcer of left foot, stage 3 ([facility] [address]     Procedure Note    Performed by: Andrew Bennett DPM    Consent obtained: Yes    Time out taken:  Yes    Pain Control: Anesthetic  Anesthetic: 4% Topical Xylocaine     Debridement:Excisional Debridement    Using curette the wound was sharply debrided    down through and including the removal of epidermis, dermis, subcutaneous tissue and muscle/fascia.         Devitalized Tissue Debrided:  fibrin, biofilm, slough, exudate and callus    Pre Debridement Measurements:  Are located in the Wound Documentation Flow Sheet  Wound Care Documentation:  Wound 11/28/18 Ankle Left;Lateral #1 (Active)   Wound Image   3/27/2019  1:11 PM   Wound Pressure Stage  2 5/1/2019  2:25 PM   Dressing Status Clean;Dry; Intact 5/1/2019  5:01 PM   Dressing Changed Changed/New 5/1/2019  5:01 PM   Dressing/Treatment Antibacterial Ointment;Dry Dressing; Pharmaceutical agent (see MAR) 5/1/2019  5:01 PM   Wound Cleansed Rinsed/Irrigated with saline 5/1/2019  2:25 PM   Wound Length (cm) 1.8 cm 5/1/2019  2:25 PM   Wound Width (cm) 0.6 cm 5/1/2019  2:25 PM   Wound Depth (cm) 0.1 cm 5/1/2019  2:25 PM   Wound Surface Area (cm^2) 1.08 cm^2 5/1/2019  2:25 PM   Change in Wound Size % (l*w) -140 5/1/2019  2:25 PM   Wound Volume (cm^3) 0.11 cm^3 5/1/2019  2:25 PM   Wound Healing % -22 5/1/2019  2:25 PM   Post-Procedure Length (cm) 1.8 cm 5/1/2019  2:55 PM   Post-Procedure Width (cm) 0.6 cm 5/1/2019  2:55 PM   Post-Procedure Depth (cm) 0.1 cm 5/1/2019  2:55 PM   Post-Procedure Surface Area (cm^2) 1.08 cm^2 5/1/2019  2:55 PM   Post-Procedure Volume (cm^3) 0.11 cm^3 5/1/2019  2:55 PM   Wound Assessment Bleeding 5/1/2019  2:55 PM   Drainage Amount Small 5/1/2019  2:25 PM   Drainage Description Serosanguinous 5/1/2019  2:25 PM   Odor None 5/1/2019  2:25 PM   Sarah-wound Assessment Dry 5/1/2019  2:25 PM   [place]%Wound Bed 80 5/1/2019  2:25 PM   Red%Wound Bed 0 5/1/2019  2:25 PM   Yellow%Wound Bed 20 5/1/2019  2:25 PM   Black%Wound Bed 0 5/1/2019  2:25 PM   Purple%Wound Bed 0 5/1/2019  2:25 PM   Other%Wound Bed 0 5/1/2019  2:25 PM   Number of days: 154       Wound 02/20/19 Foot Left;Posterior #2 (Active)   Wound Image   3/27/2019  1:11 PM   Wound Pressure Stage  3 5/1/2019  2:25 PM   Dressing Status Clean;Dry; Intact 4/19/2019 12:29 PM   Dressing Changed Changed/New 4/19/2019 12:29 PM   Dressing/Treatment Antibacterial Ointment;Dry Dressing; Pharmaceutical agent (see MAR) 4/19/2019 12:29 PM   Wound Cleansed Rinsed/Irrigated with saline 5/1/2019  2:25 PM   Wound Length (cm) 0 cm 5/1/2019  2:25 PM   Wound Width (cm) 0 cm 5/1/2019  2:25 PM   Wound Depth (cm) 0 cm 5/1/2019  2:25 PM   Wound Surface Area (cm^2) 0 cm^2 5/1/2019  2:25 PM   Wound Volume (cm^3) 0 cm^3 5/1/2019  2:25 PM   Post-Procedure Length (cm) 0.1 cm 5/1/2019  2:55 PM   Post-Procedure Width (cm) 0.1 cm 5/1/2019  2:55 PM   Post-Procedure Depth (cm) 0.1 cm 5/1/2019  2:55 PM   Post-Procedure Surface Area (cm^2) 0.01 cm^2 5/1/2019  2:55 PM   Post-Procedure Volume (cm^3) 0 cm^3 5/1/2019  2:55 PM   Wound Assessment Bleeding 5/1/2019  2:55 PM   Drainage Amount None 5/1/2019  2:25 PM   Drainage Description Serosanguinous 4/19/2019 11:47 AM   Odor None 4/19/2019 11:47 AM   Sarah-wound Assessment Dry;Pink 5/1/2019  2:25 PM   [place]%Wound Bed 0 4/19/2019 11:47 AM   Red%Wound Bed 0 4/19/2019 11:47 AM   Yellow%Wound Bed 100 4/19/2019 11:47 AM   Black%Wound Bed 0 4/19/2019 11:47 AM   Purple%Wound Bed 0 4/19/2019 11:47 AM   Other%Wound Bed 0 4/19/2019 11:47 AM   Number of days: 70         Total Surface Area Debrided:  2sq cm     Percentage of wound debrided 100%    Bleeding:  Minimal    Hemostasis Achieved:  by pressure    Procedural Pain:  0  / 10     Post Procedural Pain:  0 / 10     Response to treatment:  Well tolerated by patient. Plan:   Patient examined and evaluated  Wounds continue to improve posterior wound covered with eschar and stable  Continue Santyl with gentamicin cream  Continue offloading boot  Follow-up in 1 week    The nature of the patient's condition was explained in depth.  The patient was informed that their compliance to the treatment plan is paramount to successful healing and prevention of further ulceration and/or infection     Discharge Treatment : Daily dressing changes with antibiotic ointment    Written Patient Discharge Instructions Given            Electronically signed by Pranav Arias DPM on 5/1/2019 at 5:19 PM

## 2019-05-08 ENCOUNTER — HOSPITAL ENCOUNTER (OUTPATIENT)
Dept: WOUND CARE | Age: 49
Discharge: HOME | End: 2019-05-08
Payer: COMMERCIAL

## 2019-05-08 VITALS — DIASTOLIC BLOOD PRESSURE: 78 MMHG | SYSTOLIC BLOOD PRESSURE: 117 MMHG | HEART RATE: 76 BPM | TEMPERATURE: 98.1 F

## 2019-05-08 PROCEDURE — 11043 DBRDMT MUSC&/FSCA 1ST 20/<: CPT

## 2019-05-08 NOTE — PROGRESS NOTES
[facility]  Progress Note and Procedure Note      Terese Schulz  AGE: 53 y.o. GENDER: female  : 1970  TODAY'S DATE:  2019    Subjective:     Chief Complaint   Patient presents with    Wound Check     left foot F/U         HISTORY of PRESENT ILLNESS HPI     Terese Schulz is a 53 y.o. female who presents today for wound evaluation. History of Wound: Patient is paraplegic. She admits to getting the wound during the summer of    She admits to changing the bandage as directed. She has no pain. She's been placing the Santyl and gentamicin as directed. She thinks that look better. She denies nausea, vomiting, fever, chills, shortness of breath or chest pain. Wound Pain:  none  Severity:  0 / 10   Wound Type:  pressure  Modifying Factors:  edema  Associated Signs/Symptoms:  edema and drainage        PAST MEDICAL HISTORY        Diagnosis Date    Hypertension     Neuromuscular disorder ([facility] [address]     L1 paralysis, accident       PAST SURGICAL HISTORY    Past Surgical History:   Procedure Laterality Date    SPINAL FUSION         FAMILY HISTORY    History reviewed. No pertinent family history.     SOCIAL HISTORY    Social History     Tobacco Use    Smoking status: Never Smoker    Smokeless tobacco: Never Used   Substance Use Topics    Alcohol use: Yes     Comment: socially    Drug use: No       ALLERGIES    Allergies   Allergen Reactions    Sulfa Antibiotics Shortness Of Breath       MEDICATIONS    Current Outpatient Medications on File Prior to Encounter   Medication Sig Dispense Refill    gentamicin (GARAMYCIN) 0.1 % cream Apply topically daily 15 g 1    ammonium lactate (LAC-HYDRIN) 12 % cream Apply topically to dry skin on feet daily 1 Tube 4    amLODIPine (NORVASC) 5 MG tablet Take 5 mg by mouth daily      hydrochlorothiazide (MICROZIDE) 12.5 MG capsule Take 12.5 mg by mouth daily      losartan (COZAAR) 25 MG tablet Take 25 mg by mouth daily      atenolol (TENORMIN) 50 MG tablet Take 50 mg by mouth daily      traMADol (ULTRAM) 50 MG tablet Take 50 mg by mouth every 4 hours as needed for Pain. .       No current facility-administered medications on file prior to encounter. REVIEW OF SYSTEMS    Pertinent items are noted in HPI. Objective:      /78   Pulse 76   Temp 98.1 °F (36.7 °C) (Oral)     PHYSICAL EXAM    Vascular: Vascular status Intact  palpable pedal pulses, right DP1/4 and PT1/4, left DP1/4 and PT1/4. CFT 3 seconds digits 1 to 5 bilateral.  Hair growthPresent both lower extremities and feet. Skin temperature is warm to warm from pretibial area to distal digits bilateral.  Exam is negative for rubor, pallor, cyanosis or signs of acute vascular compromise bilaterally. Exam is positive for edema bilateral lower extremity. Varicosities Absent  bilateral lower extremity. Neuro: Neurologic status absent bilateral with epicritic Absent  , proprioceptive Absent , vibratory sensationAbsent  and protopathic Absent. DTRs Absent  bilateral Achilles. There were no reproducible neuritic symptoms on exam bilateral feet/ankles. Derm: Ulceration to left ankle. Ecchymosis Absent  bilateral feet/foot. Musculoskeletal: No pain with wound debridement 5/5 muscle strength in/eversion and dorsi/plantarflexion bilateral feet. No gross instability noted. Assessment:     Patient Active Problem List   Diagnosis    Pressure ulcer of left foot, stage 3 ([facility] [address]     Procedure Note    Performed by: Mario Frye DPM    Consent obtained: Yes    Time out taken:  Yes    Pain Control: Anesthetic  Anesthetic: 4% Topical Xylocaine     Debridement:Excisional Debridement    Using curette the wound was sharply debrided    down through and including the removal of epidermis, dermis, subcutaneous tissue and muscle/fascia.         Devitalized Tissue Debrided:  fibrin, biofilm, slough, exudate and callus    Pre Debridement Measurements:  Are located in the Wound Documentation Flow Sheet  Wound Care Documentation:  Wound 11/28/18 Ankle Left;Lateral #1 (Active)   Wound Image   3/27/2019  1:11 PM   Wound Pressure Stage  2 5/8/2019  3:04 PM   Dressing Status Clean;Dry; Intact 5/1/2019  5:01 PM   Dressing Changed Changed/New 5/1/2019  5:01 PM   Dressing/Treatment Antibacterial Ointment;Dry Dressing; Pharmaceutical agent (see MAR) 5/1/2019  5:01 PM   Wound Cleansed Rinsed/Irrigated with saline 5/8/2019  3:04 PM   Wound Length (cm) 0.6 cm 5/8/2019  3:04 PM   Wound Width (cm) 0.4 cm 5/8/2019  3:04 PM   Wound Depth (cm) 0.1 cm 5/8/2019  3:04 PM   Wound Surface Area (cm^2) 0.24 cm^2 5/8/2019  3:04 PM   Change in Wound Size % (l*w) 46.67 5/8/2019  3:04 PM   Wound Volume (cm^3) 0.02 cm^3 5/8/2019  3:04 PM   Wound Healing % 78 5/8/2019  3:04 PM   Post-Procedure Length (cm) 1.8 cm 5/1/2019  2:55 PM   Post-Procedure Width (cm) 0.6 cm 5/1/2019  2:55 PM   Post-Procedure Depth (cm) 0.1 cm 5/1/2019  2:55 PM   Post-Procedure Surface Area (cm^2) 1.08 cm^2 5/1/2019  2:55 PM   Post-Procedure Volume (cm^3) 0.11 cm^3 5/1/2019  2:55 PM   Wound Assessment Pink 5/8/2019  3:04 PM   Drainage Amount Scant 5/8/2019  3:04 PM   Drainage Description Serosanguinous 5/8/2019  3:04 PM   Odor None 5/8/2019  3:04 PM   Sarah-wound Assessment Dry 5/8/2019  3:04 PM   [place]%Wound Bed 100 5/8/2019  3:04 PM   Red%Wound Bed 0 5/8/2019  3:04 PM   Yellow%Wound Bed 0 5/8/2019  3:04 PM   Black%Wound Bed 00 5/8/2019  3:04 PM   Purple%Wound Bed 0 5/8/2019  3:04 PM   Other%Wound Bed 0 5/8/2019  3:04 PM   Number of days: 161       Wound 05/08/19 Ankle Lateral;Left;Proximal #3 (Active)   Wound Image   5/8/2019  3:04 PM   Wound Pressure Stage  2 5/8/2019  3:04 PM   Wound Cleansed Rinsed/Irrigated with saline 5/8/2019  3:04 PM   Wound Length (cm) 0.8 cm 5/8/2019  3:04 PM   Wound Width (cm) 0.4 cm 5/8/2019  3:04 PM   Wound Depth (cm) 0.1 cm 5/8/2019  3:04 PM   Wound Surface Area (cm^2) 0.32 cm^2 5/8/2019  3:04 PM   Wound Volume (cm^3) 0.03 cm^3 5/8/2019  3:04 PM   Post-Procedure Length (cm) 0.8 cm 5/8/2019  3:42 PM   Post-Procedure Width (cm) 0.4 cm 5/8/2019  3:42 PM   Post-Procedure Depth (cm) 0.1 cm 5/8/2019  3:42 PM   Post-Procedure Surface Area (cm^2) 0.32 cm^2 5/8/2019  3:42 PM   Post-Procedure Volume (cm^3) 0.03 cm^3 5/8/2019  3:42 PM   Wound Assessment Bleeding 5/8/2019  3:42 PM   Drainage Amount Scant 5/8/2019  3:04 PM   Drainage Description Serosanguinous 5/8/2019  3:04 PM   Odor None 5/8/2019  3:04 PM   Sarah-wound Assessment Dry 5/8/2019  3:04 PM   [place]%Wound Bed 0 5/8/2019  3:04 PM   Red%Wound Bed 0 5/8/2019  3:04 PM   Yellow%Wound Bed 100 5/8/2019  3:04 PM   Black%Wound Bed 0 5/8/2019  3:04 PM   Purple%Wound Bed 0 5/8/2019  3:04 PM   Other%Wound Bed 0 5/8/2019  3:04 PM   Number of days: 0       Total Surface Area Debrided:  1.4sq cm     Percentage of wound debrided 100%    Bleeding:  Minimal    Hemostasis Achieved:  by pressure    Procedural Pain:  0  / 10     Post Procedural Pain:  0 / 10     Response to treatment:  Well tolerated by patient. Plan:   Patient examined and evaluated  Wounds debrided without incident  Posterior calcaneal wound healed  Wounds improving  Dental gentamicin to the proximal wound, gentamicin the distal wound. Continue offloading boot  Follow-up in 1 week    The nature of the patient's condition was explained in depth.  The patient was informed that their compliance to the treatment plan is paramount to successful healing and prevention of further ulceration and/or infection     Discharge Treatment : Daily dressing changes with antibiotic ointment    Written Patient Discharge Instructions Given            Electronically signed by Blaine Sanders DPM on 5/8/2019 at 5:19 PM

## 2019-05-15 ENCOUNTER — HOSPITAL ENCOUNTER (OUTPATIENT)
Dept: WOUND CARE | Age: 49
Discharge: HOME | End: 2019-05-15
Payer: COMMERCIAL

## 2019-05-15 VITALS — RESPIRATION RATE: 16 BRPM | DIASTOLIC BLOOD PRESSURE: 75 MMHG | HEART RATE: 67 BPM | SYSTOLIC BLOOD PRESSURE: 104 MMHG

## 2019-05-15 PROCEDURE — 11043 DBRDMT MUSC&/FSCA 1ST 20/<: CPT

## 2019-05-15 RX ORDER — LIDOCAINE 40 MG/G
CREAM TOPICAL PRN
Status: DISCONTINUED | OUTPATIENT
Start: 2019-05-15 | End: 2019-05-16 | Stop reason: HOSPADM

## 2019-05-15 NOTE — PROGRESS NOTES
[facility]  Progress Note and Procedure Note      Sindy Daniels  AGE: 53 y.o. GENDER: female  : 1970  TODAY'S DATE:  5/15/2019    Subjective:     Chief Complaint   Patient presents with    Wound Check     Left ankle         HISTORY of PRESENT ILLNESS HPI     Sindy Daniels is a 53 y.o. female who presents today for wound evaluation. History of Wound: Patient is paraplegic. She admits to getting the wound during the summer of    She has been changing the bandage daily with gentamicin and Santyl. She has no pain. She thinks the wounds look better. She denies nausea, vomiting, fever, chills, shortness of breath or chest pain. Wound Pain:  none  Severity:  0 / 10   Wound Type:  pressure  Modifying Factors:  edema  Associated Signs/Symptoms:  edema and drainage        PAST MEDICAL HISTORY        Diagnosis Date    Hypertension     Neuromuscular disorder ([facility] [address]     L1 paralysis, accident       PAST SURGICAL HISTORY    Past Surgical History:   Procedure Laterality Date    SPINAL FUSION         FAMILY HISTORY    History reviewed. No pertinent family history.     SOCIAL HISTORY    Social History     Tobacco Use    Smoking status: Never Smoker    Smokeless tobacco: Never Used   Substance Use Topics    Alcohol use: Yes     Comment: socially    Drug use: No       ALLERGIES    Allergies   Allergen Reactions    Sulfa Antibiotics Shortness Of Breath       MEDICATIONS    Current Outpatient Medications on File Prior to Encounter   Medication Sig Dispense Refill    gentamicin (GARAMYCIN) 0.1 % cream Apply topically daily 15 g 1    ammonium lactate (LAC-HYDRIN) 12 % cream Apply topically to dry skin on feet daily 1 Tube 4    amLODIPine (NORVASC) 5 MG tablet Take 5 mg by mouth daily      hydrochlorothiazide (MICROZIDE) 12.5 MG capsule Take 12.5 mg by mouth daily      losartan (COZAAR) 25 MG tablet Take 25 mg by mouth daily      atenolol (TENORMIN) 50 MG tablet Take 50 mg by mouth daily      traMADol (ULTRAM) 50 MG tablet Take 50 mg by mouth every 4 hours as needed for Pain. .       No current facility-administered medications on file prior to encounter. REVIEW OF SYSTEMS    Pertinent items are noted in HPI. Objective:      /75   Pulse 67   Resp 16     PHYSICAL EXAM    Vascular: Vascular status Intact  palpable pedal pulses, right DP1/4 and PT1/4, left DP1/4 and PT1/4. CFT 3 seconds digits 1 to 5 bilateral.  Hair growthPresent both lower extremities and feet. Skin temperature is warm to warm from pretibial area to distal digits bilateral.  Exam is negative for rubor, pallor, cyanosis or signs of acute vascular compromise bilaterally. Exam is positive for edema bilateral lower extremity. Varicosities Absent  bilateral lower extremity. Neuro: Neurologic status absent bilateral with epicritic Absent  , proprioceptive Absent , vibratory sensationAbsent  and protopathic Absent. DTRs Absent  bilateral Achilles. There were no reproducible neuritic symptoms on exam bilateral feet/ankles. Derm: Ulceration to left ankle. Ecchymosis Absent  bilateral feet/foot. Musculoskeletal: No pain with wound debridement 5/5 muscle strength in/eversion and dorsi/plantarflexion bilateral feet. No gross instability noted. Assessment:     Patient Active Problem List   Diagnosis    Pressure ulcer of left foot, stage 3 ([facility] [address]     Procedure Note    Performed by: Severiano Pears, DPM    Consent obtained: Yes    Time out taken:  Yes    Pain Control: Anesthetic  Anesthetic: 4% Topical Xylocaine     Debridement:Excisional Debridement    Using curette the wound was sharply debrided    down through and including the removal of epidermis, dermis, subcutaneous tissue and muscle/fascia.         Devitalized Tissue Debrided:  fibrin, biofilm, slough, exudate and callus    Pre Debridement Measurements:  Are located in the Wound Documentation Flow Sheet  Wound Care Documentation:  Wound 11/28/18 Ankle Left;Lateral #1 (Active)   Wound Image   3/27/2019  1:11 PM   Wound Pressure Stage  2 5/15/2019  1:40 PM   Dressing Status Clean;Dry; Intact 5/1/2019  5:01 PM   Dressing Changed Changed/New 5/1/2019  5:01 PM   Dressing/Treatment Antibacterial Ointment;Dry Dressing; Pharmaceutical agent (see MAR) 5/1/2019  5:01 PM   Wound Cleansed Rinsed/Irrigated with saline 5/15/2019  1:40 PM   Wound Length (cm) 0.1 cm 5/15/2019  1:40 PM   Wound Width (cm) 0.1 cm 5/15/2019  1:40 PM   Wound Depth (cm) 0.1 cm 5/15/2019  1:40 PM   Wound Surface Area (cm^2) 0.01 cm^2 5/15/2019  1:40 PM   Change in Wound Size % (l*w) 97.78 5/15/2019  1:40 PM   Wound Volume (cm^3) 0 cm^3 5/15/2019  1:40 PM   Wound Healing % 100 5/15/2019  1:40 PM   Post-Procedure Length (cm) 0.1 cm 5/15/2019  2:01 PM   Post-Procedure Width (cm) 0.1 cm 5/15/2019  2:01 PM   Post-Procedure Depth (cm) 0.1 cm 5/15/2019  2:01 PM   Post-Procedure Surface Area (cm^2) 0.01 cm^2 5/15/2019  2:01 PM   Post-Procedure Volume (cm^3) 0 cm^3 5/15/2019  2:01 PM   Wound Assessment Bleeding 5/15/2019  2:01 PM   Drainage Amount Scant 5/15/2019  1:40 PM   Drainage Description Serosanguinous 5/15/2019  1:40 PM   Odor None 5/15/2019  1:40 PM   Sarah-wound Assessment Dry 5/15/2019  1:40 PM   [place]%Wound Bed 100 5/15/2019  1:40 PM   Red%Wound Bed 0 5/15/2019  1:40 PM   Yellow%Wound Bed 0 5/15/2019  1:40 PM   Black%Wound Bed 0 5/15/2019  1:40 PM   Purple%Wound Bed 0 5/15/2019  1:40 PM   Other%Wound Bed 0 5/15/2019  1:40 PM   Number of days: 168       Wound 05/08/19 Ankle Lateral;Left;Proximal #3 (Active)   Wound Image   5/8/2019  3:04 PM   Wound Pressure Stage  2 5/15/2019  1:40 PM   Wound Cleansed Rinsed/Irrigated with saline 5/15/2019  1:40 PM   Wound Length (cm) 0.7 cm 5/15/2019  1:40 PM   Wound Width (cm) 0.3 cm 5/15/2019  1:40 PM   Wound Depth (cm) 0.1 cm 5/15/2019  1:40 PM   Wound Surface Area (cm^2) 0.21 cm^2 5/15/2019  1:40 PM   Change in Wound Size % (l*w) 34.38 5/15/2019  1:40 PM   Wound Volume (cm^3) 0.02 cm^3 5/15/2019  1:40 PM   Wound Healing % 33 5/15/2019  1:40 PM   Post-Procedure Length (cm) 0.7 cm 5/15/2019  2:01 PM   Post-Procedure Width (cm) 0.3 cm 5/15/2019  2:01 PM   Post-Procedure Depth (cm) 0.1 cm 5/15/2019  2:01 PM   Post-Procedure Surface Area (cm^2) 0.21 cm^2 5/15/2019  2:01 PM   Post-Procedure Volume (cm^3) 0.02 cm^3 5/15/2019  2:01 PM   Wound Assessment Bleeding 5/15/2019  2:01 PM   Drainage Amount Scant 5/15/2019  1:40 PM   Drainage Description Serosanguinous 5/15/2019  1:40 PM   Odor None 5/15/2019  1:40 PM   Sarah-wound Assessment Dry 5/15/2019  1:40 PM   [place]%Wound Bed 20 5/15/2019  1:40 PM   Red%Wound Bed 0 5/15/2019  1:40 PM   Yellow%Wound Bed 80 5/15/2019  1:40 PM   Black%Wound Bed 0 5/15/2019  1:40 PM   Purple%Wound Bed 0 5/15/2019  1:40 PM   Other%Wound Bed 0 5/15/2019  1:40 PM   Number of days: 7       Total Surface Area Debrided:  0.22sq cm     Percentage of wound debrided 100%    Bleeding:  Minimal    Hemostasis Achieved:  by pressure    Procedural Pain:  0  / 10     Post Procedural Pain:  0 / 10     Response to treatment:  Well tolerated by patient. Plan:   Patient examined and evaluated  Wounds debrided without incident, both improved since last week  Posterior calcaneal wound healed  Wounds improving   gentamicin to the proximal wound, gentamicin the distal wound. Continue offloading boot  Follow-up in 1 week    The nature of the patient's condition was explained in depth.  The patient was informed that their compliance to the treatment plan is paramount to successful healing and prevention of further ulceration and/or infection     Discharge Treatment : Daily dressing changes with antibiotic ointment    Written Patient Discharge Instructions Given            Electronically signed by Dominguez Chung DPM on 5/15/2019 at 2:15 PM

## 2019-05-22 ENCOUNTER — HOSPITAL ENCOUNTER (OUTPATIENT)
Dept: WOUND CARE | Age: 49
Discharge: HOME | End: 2019-05-22
Payer: COMMERCIAL

## 2019-05-22 VITALS — RESPIRATION RATE: 16 BRPM | SYSTOLIC BLOOD PRESSURE: 115 MMHG | HEART RATE: 66 BPM | DIASTOLIC BLOOD PRESSURE: 80 MMHG

## 2019-05-22 PROCEDURE — 11042 DBRDMT SUBQ TIS 1ST 20SQCM/<: CPT

## 2019-05-22 ASSESSMENT — PAIN SCALES - GENERAL: PAINLEVEL_OUTOF10: 3

## 2019-05-22 ASSESSMENT — PAIN DESCRIPTION - PAIN TYPE: TYPE: CHRONIC PAIN

## 2019-05-22 ASSESSMENT — PAIN DESCRIPTION - LOCATION: LOCATION: GENERALIZED

## 2019-05-22 NOTE — PROGRESS NOTES
daily      atenolol (TENORMIN) 50 MG tablet Take 50 mg by mouth daily      traMADol (ULTRAM) 50 MG tablet Take 50 mg by mouth every 4 hours as needed for Pain. .       No current facility-administered medications on file prior to encounter. REVIEW OF SYSTEMS    Pertinent items are noted in HPI. Objective:      /80   Pulse 66   Resp 16     PHYSICAL EXAM    Vascular: Vascular status Intact  palpable pedal pulses, right DP1/4 and PT1/4, left DP1/4 and PT1/4. CFT 3 seconds digits 1 to 5 bilateral.  Hair growthPresent both lower extremities and feet. Skin temperature is warm to warm from pretibial area to distal digits bilateral.  Exam is negative for rubor, pallor, cyanosis or signs of acute vascular compromise bilaterally. Exam is positive for edema bilateral lower extremity. Varicosities Absent  bilateral lower extremity. Neuro: Neurologic status absent bilateral with epicritic Absent, proprioceptive Absent , vibratory sensationAbsent  and protopathic Absent. DTRs Absent  bilateral Achilles. There were no reproducible neuritic symptoms on exam bilateral feet/ankles. Derm: Ulceration to left ankle distal wound healed proximal wound remains open. Ecchymosis Absent  bilateral feet/foot. Musculoskeletal: No pain with wound debridement 5/5 muscle strength in/eversion and dorsi/plantarflexion bilateral feet. No gross instability noted. Assessment:     Patient Active Problem List   Diagnosis    Pressure ulcer of left foot, stage 3 ([facility] [address]     Procedure Note    Performed by: Lea Dash DPM    Consent obtained: Yes    Time out taken:  Yes    Pain Control: Anesthetic  Anesthetic: 4% Topical Xylocaine     Debridement:Excisional Debridement    Using curette the wound was sharply debrided    down through and including the removal of epidermis, dermis and subcutaneous tissue.         Devitalized Tissue Debrided:  fibrin, biofilm, slough, exudate and callus    Pre Debridement Measurements:  Are located in the Wound Documentation Flow Sheet  Wound Care Documentation:  Wound 11/28/18 Ankle Left;Lateral #1 (Active)   Wound Image   3/27/2019  1:11 PM   Wound Pressure Stage  2 5/22/2019  1:55 PM   Dressing Status Clean;Dry; Intact 5/1/2019  5:01 PM   Dressing Changed Changed/New 5/1/2019  5:01 PM   Dressing/Treatment Antibacterial Ointment;Dry Dressing; Pharmaceutical agent (see MAR) 5/1/2019  5:01 PM   Wound Cleansed Rinsed/Irrigated with saline 5/22/2019  1:55 PM   Wound Length (cm) 0 cm 5/22/2019  1:55 PM   Wound Width (cm) 0 cm 5/22/2019  1:55 PM   Wound Depth (cm) 0 cm 5/22/2019  1:55 PM   Wound Surface Area (cm^2) 0 cm^2 5/22/2019  1:55 PM   Change in Wound Size % (l*w) 100 5/22/2019  1:55 PM   Wound Volume (cm^3) 0 cm^3 5/22/2019  1:55 PM   Wound Healing % 100 5/22/2019  1:55 PM   Post-Procedure Length (cm) 0.1 cm 5/15/2019  2:01 PM   Post-Procedure Width (cm) 0.1 cm 5/15/2019  2:01 PM   Post-Procedure Depth (cm) 0.1 cm 5/15/2019  2:01 PM   Post-Procedure Surface Area (cm^2) 0.01 cm^2 5/15/2019  2:01 PM   Post-Procedure Volume (cm^3) 0 cm^3 5/15/2019  2:01 PM   Wound Assessment Dry;Pink;Epithelialization 5/22/2019  1:55 PM   Drainage Amount None 5/22/2019  1:55 PM   Drainage Description Serosanguinous 5/15/2019  1:40 PM   Odor None 5/22/2019  1:55 PM   Sarah-wound Assessment Dry 5/22/2019  1:55 PM   [place]%Wound Bed 100 5/22/2019  1:55 PM   Red%Wound Bed 0 5/22/2019  1:55 PM   Yellow%Wound Bed 0 5/22/2019  1:55 PM   Black%Wound Bed 0 5/22/2019  1:55 PM   Purple%Wound Bed 0 5/22/2019  1:55 PM   Other%Wound Bed 0 5/22/2019  1:55 PM   Number of days: 175       Wound 05/08/19 Ankle Lateral;Left;Proximal #3 (Active)   Wound Image   5/8/2019  3:04 PM   Wound Pressure Stage  2 5/22/2019  1:55 PM   Wound Cleansed Rinsed/Irrigated with saline 5/22/2019  1:55 PM   Wound Length (cm) 0.7 cm 5/22/2019  1:55 PM   Wound Width (cm) 0.3 cm 5/22/2019  1:55 PM   Wound Depth (cm) 0.1 cm 5/22/2019  1:55 PM Wound Surface Area (cm^2) 0.21 cm^2 5/22/2019  1:55 PM   Change in Wound Size % (l*w) 34.38 5/22/2019  1:55 PM   Wound Volume (cm^3) 0.02 cm^3 5/22/2019  1:55 PM   Wound Healing % 33 5/22/2019  1:55 PM   Post-Procedure Length (cm) 0.7 cm 5/22/2019  2:06 PM   Post-Procedure Width (cm) 0.3 cm 5/22/2019  2:06 PM   Post-Procedure Depth (cm) 0.1 cm 5/22/2019  2:06 PM   Post-Procedure Surface Area (cm^2) 0.21 cm^2 5/22/2019  2:06 PM   Post-Procedure Volume (cm^3) 0.02 cm^3 5/22/2019  2:06 PM   Wound Assessment Bleeding 5/22/2019  2:06 PM   Drainage Amount Scant 5/22/2019  1:55 PM   Drainage Description Serosanguinous 5/22/2019  1:55 PM   Odor None 5/22/2019  1:55 PM   Sarah-wound Assessment Dry 5/22/2019  1:55 PM   [place]%Wound Bed 0 5/22/2019  1:55 PM   Red%Wound Bed 0 5/22/2019  1:55 PM   Yellow%Wound Bed 100 5/22/2019  1:55 PM   Black%Wound Bed 0 5/22/2019  1:55 PM   Purple%Wound Bed 0 5/22/2019  1:55 PM   Other%Wound Bed 0 5/22/2019  1:55 PM   Number of days: 14         Total Surface Area Debrided:  0.21sq cm     Percentage of wound debrided 100%    Bleeding:  Minimal    Hemostasis Achieved:  by pressure    Procedural Pain:  0  / 10     Post Procedural Pain:  0 / 10     Response to treatment:  Well tolerated by patient. Plan:   Patient examined and evaluated  Wound debrided without incident  Posterior calcaneal wound healed  Wounds improving  Gentamicin and Santyl to the proximal wound  Continue offloading boot  Follow-up in 2 weeks    The nature of the patient's condition was explained in depth.  The patient was informed that their compliance to the treatment plan is paramount to successful healing and prevention of further ulceration and/or infection     Discharge Treatment : Daily dressing changes with antibiotic ointment    Written Patient Discharge Instructions Given            Electronically signed by Cadence Valenzuela DPM on 5/22/2019 at 2:26 PM

## 2019-06-05 ENCOUNTER — HOSPITAL ENCOUNTER (OUTPATIENT)
Dept: WOUND CARE | Age: 49
Discharge: HOME | End: 2019-06-05
Payer: COMMERCIAL

## 2019-06-05 VITALS — SYSTOLIC BLOOD PRESSURE: 146 MMHG | DIASTOLIC BLOOD PRESSURE: 93 MMHG | HEART RATE: 74 BPM | RESPIRATION RATE: 16 BRPM

## 2019-06-05 PROCEDURE — 99212 OFFICE O/P EST SF 10 MIN: CPT

## 2019-06-05 ASSESSMENT — PAIN SCALES - GENERAL: PAINLEVEL_OUTOF10: 7

## 2019-06-05 ASSESSMENT — PAIN DESCRIPTION - PAIN TYPE: TYPE: CHRONIC PAIN

## 2019-06-05 ASSESSMENT — PAIN DESCRIPTION - LOCATION: LOCATION: BACK

## 2019-06-05 NOTE — PROGRESS NOTES
[facility]  Progress Note and Procedure Note      Samson Bell  AGE: 53 y.o. GENDER: female  : 1970  TODAY'S DATE:  2019    Subjective:     Chief Complaint   Patient presents with    Wound Check     Left ankle         HISTORY of PRESENT ILLNESS HPI     Samson Bell is a 53 y.o. female who presents today for wound evaluation. History of Wound: Patient is paraplegic. She admits to getting the wound during the summer of    She is changing the bandage daily without incident. She states that there is been no drainage the past week. She denies nausea, vomiting, fever, chills, shortness of breath or chest pain. Wound Pain:  none  Severity:  0 / 10   Wound Type:  pressure  Modifying Factors:  edema  Associated Signs/Symptoms:  edema and drainage        PAST MEDICAL HISTORY        Diagnosis Date    Hypertension     Neuromuscular disorder ([place] [address]     L1 paralysis, accident       PAST SURGICAL HISTORY    Past Surgical History:   Procedure Laterality Date    SPINAL FUSION         FAMILY HISTORY    History reviewed. No pertinent family history.     SOCIAL HISTORY    Social History     Tobacco Use    Smoking status: Never Smoker    Smokeless tobacco: Never Used   Substance Use Topics    Alcohol use: Yes     Comment: socially    Drug use: No       ALLERGIES    Allergies   Allergen Reactions    Sulfa Antibiotics Shortness Of Breath       MEDICATIONS    Current Outpatient Medications on File Prior to Encounter   Medication Sig Dispense Refill    gentamicin (GARAMYCIN) 0.1 % cream Apply topically daily 15 g 1    ammonium lactate (LAC-HYDRIN) 12 % cream Apply topically to dry skin on feet daily 1 Tube 4    amLODIPine (NORVASC) 5 MG tablet Take 5 mg by mouth daily      hydrochlorothiazide (MICROZIDE) 12.5 MG capsule Take 12.5 mg by mouth daily      losartan (COZAAR) 25 MG tablet Take 25 mg by mouth daily      atenolol (TENORMIN) 50 MG tablet Take 50 mg by mouth daily      traMADol (ULTRAM) 50 MG tablet Take 50 mg by mouth every 4 hours as needed for Pain. .       No current facility-administered medications on file prior to encounter. REVIEW OF SYSTEMS    Pertinent items are noted in HPI. Objective:      BP (!) 146/93   Pulse 74   Resp 16     PHYSICAL EXAM    Vascular: Vascular status Intact  palpable pedal pulses, right DP1/4 and PT1/4, left DP1/4 and PT1/4. CFT 3 seconds digits 1 to 5 bilateral.  Hair growthPresent both lower extremities and feet. Skin temperature is warm to warm from pretibial area to distal digits bilateral.  Exam is negative for rubor, pallor, cyanosis or signs of acute vascular compromise bilaterally. Exam is positive for edema bilateral lower extremity. Varicosities Absent  bilateral lower extremity. Neuro: Neurologic status absent bilateral with epicritic Absent, proprioceptive Absent , vibratory sensationAbsent  and protopathic Absent. DTRs Absent  bilateral Achilles. There were no reproducible neuritic symptoms on exam bilateral feet/ankles. Derm: Wounds healed. Ecchymosis Absent  bilateral feet/foot. Musculoskeletal: No pain with wound debridement 5/5 muscle strength in/eversion and dorsi/plantarflexion bilateral feet. No gross instability noted. Assessment:     Patient Active Problem List   Diagnosis    Pressure ulcer of left foot, stage 3 ([place] [address]     Plan:   Patient examined and evaluated  Wounds healed  Dry sterile dressing for 1-2 weeks if no drainage no bandage as needed follow-up as needed  Arch from the wound care center  The nature of the patient's condition was explained in depth.  The patient was informed that their compliance to the treatment plan is paramount to successful healing and prevention of further ulceration and/or infection     Discharge Treatment : Daily dressing changes with antibiotic ointment    Written Patient Discharge Instructions Given            Electronically signed by Pino Zambrano Aleksandar Dorado on 6/5/2019 at 4:26 PM